# Patient Record
Sex: FEMALE | Race: WHITE
[De-identification: names, ages, dates, MRNs, and addresses within clinical notes are randomized per-mention and may not be internally consistent; named-entity substitution may affect disease eponyms.]

---

## 2018-06-27 ENCOUNTER — HOSPITAL ENCOUNTER (OUTPATIENT)
Dept: HOSPITAL 97 - ER | Age: 68
Setting detail: OBSERVATION
LOS: 1 days | Discharge: HOME | End: 2018-06-28
Attending: FAMILY MEDICINE | Admitting: FAMILY MEDICINE
Payer: COMMERCIAL

## 2018-06-27 VITALS — BODY MASS INDEX: 31.3 KG/M2

## 2018-06-27 DIAGNOSIS — F32.9: ICD-10-CM

## 2018-06-27 DIAGNOSIS — R07.9: Primary | ICD-10-CM

## 2018-06-27 DIAGNOSIS — I10: ICD-10-CM

## 2018-06-27 DIAGNOSIS — J44.9: ICD-10-CM

## 2018-06-27 DIAGNOSIS — E78.2: ICD-10-CM

## 2018-06-27 DIAGNOSIS — E03.9: ICD-10-CM

## 2018-06-27 DIAGNOSIS — G93.89: ICD-10-CM

## 2018-06-27 DIAGNOSIS — R51: ICD-10-CM

## 2018-06-27 LAB
ALBUMIN SERPL BCP-MCNC: 3.6 G/DL (ref 3.4–5)
ALP SERPL-CCNC: 62 U/L (ref 45–117)
ALT SERPL W P-5'-P-CCNC: 36 U/L (ref 12–78)
AST SERPL W P-5'-P-CCNC: 24 U/L (ref 15–37)
BUN BLD-MCNC: 23 MG/DL (ref 7–18)
CKMB CREATINE KINASE MB: 1.1 NG/ML (ref 0.3–3.6)
GLUCOSE SERPLBLD-MCNC: 102 MG/DL (ref 74–106)
HCT VFR BLD CALC: 41.8 % (ref 36–45)
INR BLD: 0.97
LYMPHOCYTES # SPEC AUTO: 5.3 K/UL (ref 0.7–4.9)
MAGNESIUM SERPL-MCNC: 2 MG/DL (ref 1.8–2.4)
MCH RBC QN AUTO: 28.3 PG (ref 27–35)
MCV RBC: 88.3 FL (ref 80–100)
NT-PROBNP SERPL-MCNC: 225 PG/ML (ref ?–125)
PMV BLD: 7.5 FL (ref 7.6–11.3)
POTASSIUM SERPL-SCNC: 4 MMOL/L (ref 3.5–5.1)
RBC # BLD: 4.74 M/UL (ref 3.86–4.86)
UA COMPLETE W REFLEX CULTURE PNL UR: (no result)

## 2018-06-27 PROCEDURE — 81001 URINALYSIS AUTO W/SCOPE: CPT

## 2018-06-27 PROCEDURE — 36415 COLL VENOUS BLD VENIPUNCTURE: CPT

## 2018-06-27 PROCEDURE — 83880 ASSAY OF NATRIURETIC PEPTIDE: CPT

## 2018-06-27 PROCEDURE — 80061 LIPID PANEL: CPT

## 2018-06-27 PROCEDURE — 94760 N-INVAS EAR/PLS OXIMETRY 1: CPT

## 2018-06-27 PROCEDURE — 93005 ELECTROCARDIOGRAM TRACING: CPT

## 2018-06-27 PROCEDURE — 93306 TTE W/DOPPLER COMPLETE: CPT

## 2018-06-27 PROCEDURE — 84146 ASSAY OF PROLACTIN: CPT

## 2018-06-27 PROCEDURE — 85730 THROMBOPLASTIN TIME PARTIAL: CPT

## 2018-06-27 PROCEDURE — 83735 ASSAY OF MAGNESIUM: CPT

## 2018-06-27 PROCEDURE — 83970 ASSAY OF PARATHORMONE: CPT

## 2018-06-27 PROCEDURE — 93017 CV STRESS TEST TRACING ONLY: CPT

## 2018-06-27 PROCEDURE — 84439 ASSAY OF FREE THYROXINE: CPT

## 2018-06-27 PROCEDURE — 84443 ASSAY THYROID STIM HORMONE: CPT

## 2018-06-27 PROCEDURE — 87086 URINE CULTURE/COLONY COUNT: CPT

## 2018-06-27 PROCEDURE — 80048 BASIC METABOLIC PNL TOTAL CA: CPT

## 2018-06-27 PROCEDURE — 87186 SC STD MICRODIL/AGAR DIL: CPT

## 2018-06-27 PROCEDURE — 85610 PROTHROMBIN TIME: CPT

## 2018-06-27 PROCEDURE — 70450 CT HEAD/BRAIN W/O DYE: CPT

## 2018-06-27 PROCEDURE — 87088 URINE BACTERIA CULTURE: CPT

## 2018-06-27 PROCEDURE — 70553 MRI BRAIN STEM W/O & W/DYE: CPT

## 2018-06-27 PROCEDURE — 84484 ASSAY OF TROPONIN QUANT: CPT

## 2018-06-27 PROCEDURE — 85025 COMPLETE CBC W/AUTO DIFF WBC: CPT

## 2018-06-27 PROCEDURE — 78452 HT MUSCLE IMAGE SPECT MULT: CPT

## 2018-06-27 PROCEDURE — 99285 EMERGENCY DEPT VISIT HI MDM: CPT

## 2018-06-27 PROCEDURE — 87077 CULTURE AEROBIC IDENTIFY: CPT

## 2018-06-27 PROCEDURE — 71045 X-RAY EXAM CHEST 1 VIEW: CPT

## 2018-06-27 PROCEDURE — 80076 HEPATIC FUNCTION PANEL: CPT

## 2018-06-27 PROCEDURE — 96372 THER/PROPH/DIAG INJ SC/IM: CPT

## 2018-06-27 PROCEDURE — 82553 CREATINE MB FRACTION: CPT

## 2018-06-27 PROCEDURE — 82550 ASSAY OF CK (CPK): CPT

## 2018-06-27 RX ADMIN — METOPROLOL TARTRATE SCH MG: 25 TABLET ORAL at 21:56

## 2018-06-27 RX ADMIN — Medication SCH ML: at 21:56

## 2018-06-27 RX ADMIN — ENOXAPARIN SODIUM SCH: 40 INJECTION SUBCUTANEOUS at 16:00

## 2018-06-27 NOTE — ER
Nurse's Notes                                                                                     

 [facility]                                                                

Name: Elizabeth Holley                                                                                 

Age: 67 yrs                                                                                       

Sex: Female                                                                                       

: 1950                                                                                   

MRN: [id number]                                                                                   

Arrival Date: 2018                                                                          

Time: 11:56                                                                                       

Account#: [id number]                                                                            

Bed 19                                                                                            

Private MD:                                                                                       

Diagnosis: Essential (primary) hypertension;Chest pain, unspecified                               

                                                                                                  

Presentation:                                                                                     

                                                                                             

11:57 Presenting complaint: Patient states: Reports HTN for 2 days with chest pressure and    aj  

      dizziness. Transition of care: patient was not received from another setting of care.       

      Onset of symptoms was 2018. Risk Assessment: Do you want to hurt yourself or       

      someone else? Patient reports no desire to harm self or others. Initial Sepsis Screen:      

      Does the patient meet any 2 criteria? No. Patient's initial sepsis screen is negative.      

      Does the patient have a suspected source of infection? No. Patient's initial sepsis         

      screen is negative. Care prior to arrival: None.                                            

11:57 Method Of Arrival: Ambulatory                                                             

11:57 Acuity: VENECIA 3                                                                           aj  

                                                                                                  

Triage Assessment:                                                                                

12:01 General: Appears in no apparent distress. comfortable, Behavior is calm, cooperative,   aj  

      appropriate for age. Pain: Complains of pain in chest. Neuro: Level of Consciousness is     

      awake, alert, obeys commands, Oriented to person, place, time, situation, Appropriate       

      for age. Cardiovascular: Reports chest pain, fatigue, lightheadedness, nausea,              

      Capillary refill < 3 seconds in bilateral fingers Patient's skin is warm and dry.           

      Respiratory: Airway is patent Respiratory effort is even, unlabored, Respiratory            

      pattern is regular, symmetrical. Derm: Skin is intact, is healthy with good turgor,         

      Skin is pink, warm \T\ dry. normal.                                                         

                                                                                                  

Historical:                                                                                       

- Allergies:                                                                                      

12:01 Hydrocodone-Acetaminophen;                                                              aj  

- Home Meds:                                                                                      

12:01 losartan-hydrochlorothiazide 100-12.5 mg oral tab 1 tab once daily [Active];            aj  

      levothyroxine oral [Active]; Simvastatin Oral [Active]; Temazepam Oral [Active];            

      Fluoxetine Oral [Active];                                                                   

- PMHx:                                                                                           

12:01 Hyperlipidemia; Hypertension; Depression;                                               aj  

- PSHx:                                                                                           

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  

                                                                                                  

- Immunization history:: Adult Immunizations up to date.                                          

- Social history:: Smoking status: Patient/guardian denies using tobacco.                         

- Ebola Screening: : Patient negative for fever greater than or equal to 101.5 degrees            

  Fahrenheit, and additional compatible Ebola Virus Disease symptoms Patient denies               

  exposure to infectious person Patient denies travel to an Ebola-affected area in the            

  21 days before illness onset No symptoms or risks identified at this time.                      

- Family history:: not pertinent.                                                                 

                                                                                                  

                                                                                                  

Screenin:30 Abuse screen: Denies threats or abuse. Denies injuries from another. Nutritional        jl7 

      screening: No deficits noted. Tuberculosis screening: No symptoms or risk factors           

      identified. Fall Risk IV access (20 points). Total Leyva Fall Scale indicates No Risk       

      (0-24 pts).                                                                                 

                                                                                                  

Assessment:                                                                                       

13:30 General: Appears in no apparent distress. uncomfortable, Behavior is calm, cooperative, jl7 

      appropriate for age. Pain: Complains of pain in chest Pain does not radiate. Pain           

      currently is 6 out of 10 on a pain scale. Quality of pain is described as pressure,         

      Pain began 2-3 days ago. Is intermittent. Neuro: Level of Consciousness is awake,           

      alert, obeys commands, Oriented to person, place, time, situation. Cardiovascular:          

      Heart tones S1 S2 present Patient's skin is warm and dry. Respiratory: Airway is patent     

      Respiratory effort is even, unlabored, Respiratory pattern is regular, symmetrical,         

      Breath sounds are clear bilaterally. GI: No signs and/or symptoms were reported             

      involving the gastrointestinal system. : No signs and/or symptoms were reported           

      regarding the genitourinary system. EENT: No signs and/or symptoms were reported            

      regarding the EENT system. Derm: Skin is pink, warm \T\ dry. Musculoskeletal: No signs      

      and/or symptoms reported regarding the musculoskeletal system.                              

                                                                                                  

Vital Signs:                                                                                      

12:01  / 96; Pulse 84; Resp 18; Temp 98.6; Pulse Ox 96% on R/A; Weight 88 kg; Height 5  aj  

      ft. 6 in. (167.64 cm);                                                                      

13:15  / 99; Pulse 62; Resp 17; Pulse Ox 92% on R/A;                                    jl7 

14:00  / 77; Pulse 65; Resp 16; Pulse Ox 94% on R/A;                                    jl7 

15:57  / 89; Pulse 69; Resp 16; Pulse Ox 97% ;                                          jl7 

12:01 Body Mass Index 31.31 (88.00 kg, 167.64 cm)                                               

                                                                                                  

ED Course:                                                                                        

11:56 Patient arrived in ED.                                                                  aj  

11:57 Triage completed.                                                                       aj  

12:01 Arm band placed on left wrist. Patient placed in an exam room.                          aj  

12:32 Endy Finn MD is Attending Physician.                                             arie 

12:54 Tawny Johnson RN is Primary Nurse.                                                      jl7 

12:58 X-ray completed. Portable x-ray completed in exam room. Patient tolerated procedure     jb2 

      well.                                                                                       

13:01 Patient moved to CT via wheelchair.                                                     sj  

13:02 XRAY Chest (1 view) In Process Unspecified.                                             EDMS

13:02 CT completed. Patient tolerated procedure well. Patient moved back from CT.             sj  

13:09 CT Head Brain wo Cont In Process Unspecified.                                           EDMS

13:30 Patient has correct armband on for positive identification. Placed in gown. Bed in low  jl7 

      position. Call light in reach. Side rails up X 1. Cardiac monitor on. Pulse ox on. NIBP     

      on.                                                                                         

13:30 Initial lab(s) drawn, by ED staff, sent to lab. Inserted saline lock: 22 gauge in left  jl7 

      antecubital area, using aseptic technique. Blood collected. Patient maintains SpO2          

      saturation greater than 95% on room air.                                                    

14:01 Kody Galindo MD is Hospitalizing Provider.                                            [facility] 

16:19 No provider procedures requiring assistance completed. Patient admitted, IV remains in  jl7 

      place.                                                                                      

                                                                                                  

Administered Medications:                                                                         

14:07 Drug: Lovenox 1 mg/kg Route: Sub-Q; Site: abdomen;                                      jl7 

16:09 Follow up: Response: No adverse reaction                                                jl7 

14:08 Drug: Aspirin Chewable Tablet 324 mg {Note: Pt reports taking 81 mg aspirin today,      jl7 

      Administered 3 tabs.} Route: PO;                                                            

16:09 Follow up: Response: No adverse reaction                                                jl7 

16:05 Drug: Tylenol 650 mg Route: PO;                                                         jl7 

16:09 Not Given (Physician Discretion): Lopressor (metoprolol TARTRATE) 50 mg PO once         jl7 

                                                                                                  

                                                                                                  

Outcome:                                                                                          

14:02 Decision to Hospitalize by Provider.                                                    arie 

16:19 Admitted to Tele accompanied by tech, family with patient, via wheelchair, room 415,    jl7 

      with chart, Report called to  REYNA Mckeon                                                      

16:19 Condition: stable                                                                           

16:19 Discharge instructions given to patient, family, Instructed on the need for admit,          

      Demonstrated understanding of instructions.                                                 

16:43 Patient left the ED.                                                                    jl7 

                                                                                                  

Signatures:                                                                                       

Dispatcher [employer]                           EDMS                                                 

Johnson, Therese, Endy Gambino RN, MD MD cha Buechter, Jesse jb2 Jones, Susan sj Martinez, Maria                              [facility]                                                  

Tawny Johnson RN                        RN   jl7                                                  

                                                                                                  

Corrections: (The following items were deleted from the chart)                                    

14:12 13:57  / 99; Pulse 62bpm; Resp 17bpm; Pulse Ox 92% RA; [facility] 

                                                                                                  

**************************************************************************************************

## 2018-06-27 NOTE — EKG
Test Date:    2018-06-27               Test Time:    11:59:34

Technician:   GISELLE                                    

                                                     

MEASUREMENT RESULTS:                                       

Intervals:                                           

Rate:         75                                     

WY:           162                                    

QRSD:         94                                     

QT:           406                                    

QTc:          453                                    

Axis:                                                

P:            54                                     

WY:           162                                    

QRS:          -21                                    

T:            51                                     

                                                     

INTERPRETIVE STATEMENTS:                                       

                                                     

Sinus rhythm with premature supraventricular complexes

Incomplete right bundle branch block

Cannot rule out Anterior infarct, age undetermined

Abnormal ECG

Compared to ECG 06/15/2014 13:13:04

Atrial premature complex(es) now present

Myocardial infarct finding now present

Prolonged QT interval no longer present



Electronically Signed On 06-27-18 16:53:09 CDT by Bo Bill

## 2018-06-27 NOTE — ECHO
HEIGHT:  ft  in   WEIGHT:  lb  oz   DATE OF STUDY: 06/27/2018   REFER DR: Kody Galindo MD

2-DIMENSIONAL: YES

     M.MODE: YES

 DOPPLER: YES

COLOR FLOW: YES



                    TDS:  

PORTABLE: 

 DEFINITY:  

BUBBLE STUDY: 





DIAGNOSIS:  



CARDIAC HISTORY:  

CATHERIZATION: YES

SURGERY: NO

PROSTHETIC VALVE: NO

PACEMAKER: NO





MEASUREMENTS (cm)

    DIASTOLIC (NORMALS)                 SYSTOLIC (NORMALS)

IVSd                 1.1 (0.6-1.2)                    LA Diam 2.8 (1.9-4.0)     LVEF       
  66%  

LVIDd               4.0 (3.5-5.7)                        LVIDs      2.5 (2.0-3.5)     %FS  
        36%

LVPWd             1.1 (0.6-1.2)

Ao Diam           3.2 (2.0-3.7)



2 DIMENSIONAL ASSESSMENT:

RIGHT ATRIUM:                   NORMAL

LEFT ATRIUM:       NORMAL



RIGHT VENTRICLE:            NORMAL

LEFT VENTRICLE: NORMAL



TRICUSPID VALVE:             NORMAL

MITRAL VALVE:     NORMAL



PULMONIC VALVE:             NORMAL

AORTIC VALVE:     NORMAL



PERICARDIAL EFFUSION: NONE

AORTIC ROOT:      NORMAL





LEFT VENTRICULAR WALL MOTION:     NORMAL



DOPPLER/COLOR FLOW:    IMPAIRED LEFT VENTRICULAR RELAXATION.   

 MILD AORTIC REGURGITATION. 



COMMENTS:      NORMAL TWO DIMENSIONAL ECHOCARDIOGRAM.  IMPAIRED LEFT VENTRICULAR 
RELAXATION.  MILD AORTIC REGURGITATION.



TECHNOLOGIST:   DARA MARTINEZ

## 2018-06-27 NOTE — RAD REPORT
EXAM DESCRIPTION:  CT - Head Brain Wo Cont - 6/27/2018 1:09 pm

 

CLINICAL HISTORY:  Headache, hypertension

 

COMPARISON:  None.

 

TECHNIQUE:  Axial 5 mm thick images of the head were obtained without IV contrast.

 

All CT scans are performed using dose optimization technique as appropriate and may include automated
 exposure control or mA/KV adjustment according to patient size.

 

FINDINGS:  No intracranial hemorrhage present. No edema, mass effect or shift of midline structures. 
No cortical edema or sulcal effacement. No significant atrophy and only minimal chronic ischemic owens
ge. Ventricles are normal.

 

In the supra sella region there is an 8 millimeter fatty mass with calcification along the posterior 
wall. Dermoid/teratoma and hypothalamic lipoma would be differential considerations. The low-density 
craniopharyngioma felt to be a lesser consideration. No CT findings that would suggest rupture of a d
ermoid/teratoma.

 

Mastoid air cells and visualized portions of the paranasal sinuses are clear.

 

No acute bony findings.

 

 

IMPRESSION:  No hemorrhage or acute intracranial finding.

 

A small 8 mm fatty mass in the suprasellar region is present likely a dermoid/teratoma or hypothalami
c lipoma.  No evidence for leakage or rupture.

 

Supra sella masses can be associated with headache symptoms. However, this usually requires a much la
rger sized mass or rupture of a dermoid/ teratoma, neither of which are present on this examination.

## 2018-06-27 NOTE — RAD REPORT
EXAM DESCRIPTION:  RAD - Chest Single View - 6/27/2018 1:02 pm

 

CLINICAL HISTORY:  Chest pain, shortness of breath

 

COMPARISON:  May 2017

 

TECHNIQUE:  AP portable chest image was obtained 1255 hours .

 

FINDINGS:  Interstitial markings are mildly prominent, increased from comparison. A few granulomas ar
e present. No acute failure, infiltrate or mass. Heart and vasculature are normal. No measurable pleu
ral effusion and no pneumothorax. No gross bony abnormality seen. No acute aortic findings suspected.


 

IMPRESSION:  No acute cardiopulmonary process.

 

Patient has a mild baseline interstitial lung disease pattern. This is stable back to May 2017.

## 2018-06-27 NOTE — RAD REPORT
EXAM DESCRIPTION:  MRI - Brain Inc Pituitary W/Wo Con - 6/27/2018 7:21 pm

 

CLINICAL HISTORY:  Supra sella mass, headaches, hypertension

 

COMPARISON:  CT head June 27

 

TECHNIQUE:  Sagittal T1 weighted, axial proton density, T2 and T2 stir sequences were obtained. Axial
 diffusion-weighted imaging and ADC mapping sequences obtained. Thin-section sagittal and axial preco
ntrast and dynamically enhanced T1 images were obtained. Coronal imaging performed as well. A 19 mill
iliter MultiHance gadolinium contrast volume was utilized.

 

FINDINGS:  A 10 millimeter supra sella mass is present along the posterior aspect of the suprasellar 
region. The mass has homogeneous hyperintense T1 signal in masses more heterogeneous on T2 weighted s
equencing but is still predominantly hyperintense. Mass is hyperintense on T2 STIR sequencing with li
ttle identifiable signal on diffusion-weighted imaging. Post-contrast imaging shows no enhancement wi
thin the mass or along the periphery. There is normal enhancement of the pituitary stalk. No abnormal
 enhancement of the pituitary gland which is seen along the floor of the sella. No intra sella compon
ent.

 

No suspicious signal abnormality elsewhere on the examination. No enhancement abnormality. No acute i
nfarction changes are seen. Very minimal amounts of chronic ischemic change seen in the cerebral whit
e matter. Volume loss is minimal.

 

The supra sella mass signal is contained to the mass itself. No adjacent signal abnormalities to sugg
est rupture or leakage from the mass.

 

IMPRESSION:  Approximately 10 millimeter fully suprasella oval mass is present and is the correlate t
o the CT finding.

 

Patient has atrophy and chronic ischemic change including chronic ischemic change of the lobito. No acu
te infarction or other acute intracranial finding.

 

The supra sella mass differential includes dermoid/teratoma. A cystic suprasella only craniopharyngio
ma would be possible though this is an uncommon presentation. Hypothalamic lipoma would be a primary 
consideration as well. An aggressive neoplastic process is not suspected. Aneurysm is not suspected.

 

No mass effect is seen from this mass. There is no signal abnormality that would suggest leakage or r
upture of a dermoid/teratoma. It is most likely that this mass is unrelated to the patient's headache
 symptoms.

## 2018-06-27 NOTE — EDPHYS
Physician Documentation                                                                           

 [facility]                                                                

Name: Elizabeth Holley                                                                                 

Age: 67 yrs                                                                                       

Sex: Female                                                                                       

: 1950                                                                                   

MRN: [id number]                                                                                   

Arrival Date: 2018                                                                          

Time: 11:56                                                                                       

Account#: [id number]                                                                            

Bed 19                                                                                            

Private MD:                                                                                       

ED Physician Endy Finn                                                                      

HPI:                                                                                              

                                                                                             

13:58 This 67 yrs old  Female presents to ER via Ambulatory with complaints of High  arie 

      Blood Pressure, Chest Pain.                                                                 

13:58 The patient has elevated blood pressure and discovered this at home. Onset: The         arie 

      symptoms/episode began/occurred 3 day(s) ago.                                               

13:59 Onset: 3 day(s) ago. Modifying factors: The symptoms are aggravated by activity, The    arie 

      symptoms are alleviated by remaining still. The pain does not radiate. Associated signs     

      and symptoms: Pertinent positives: dizziness, headache. The chest pain is described as      

      a heaviness, a pressure.                                                                    

                                                                                                  

Historical:                                                                                       

- Allergies:                                                                                      

12:01 Hydrocodone-Acetaminophen;                                                              aj  

- Home Meds:                                                                                      

12:01 losartan-hydrochlorothiazide 100-12.5 mg oral tab 1 tab once daily [Active];            aj  

      levothyroxine oral [Active]; Simvastatin Oral [Active]; Temazepam Oral [Active];            

      Fluoxetine Oral [Active];                                                                   

- PMHx:                                                                                           

12:01 Hyperlipidemia; Hypertension; Depression;                                               aj  

- PSHx:                                                                                           

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  

                                                                                                  

- Immunization history:: Adult Immunizations up to date.                                          

- Social history:: Smoking status: Patient/guardian denies using tobacco.                         

- Ebola Screening: : Patient negative for fever greater than or equal to 101.5 degrees            

  Fahrenheit, and additional compatible Ebola Virus Disease symptoms Patient denies               

  exposure to infectious person Patient denies travel to an Ebola-affected area in the            

  21 days before illness onset No symptoms or risks identified at this time.                      

- Family history:: not pertinent.                                                                 

                                                                                                  

                                                                                                  

ROS:                                                                                              

13:59 Constitutional: Negative for fever, chills, and weight loss, Eyes: Negative for injury, arie 

      pain, redness, and discharge, ENT: Negative for injury, pain, and discharge, Neck:          

      Negative for injury, pain, and swelling, Respiratory: Negative for shortness of breath,     

      cough, wheezing, and pleuritic chest pain, Abdomen/GI: Negative for abdominal pain,         

      nausea, vomiting, diarrhea, and constipation, Back: Negative for injury and pain, :       

      Negative for injury, bleeding, discharge, and swelling, MS/Extremity: Negative for          

      injury and deformity, Skin: Negative for injury, rash, and discoloration, Psych:            

      Negative for depression, anxiety, suicide ideation, homicidal ideation, and                 

      hallucinations.                                                                             

13:59 Cardiovascular: Positive for chest pain.                                                    

13:59 Neuro: Positive for headache.                                                               

                                                                                                  

Exam:                                                                                             

13:59 Constitutional:  This is a well developed, well nourished patient who is awake, alert,  arie 

      and in no acute distress. Head/Face:  Normocephalic, atraumatic. Eyes:  Pupils equal        

      round and reactive to light, extra-ocular motions intact.  Lids and lashes normal.          

      Conjunctiva and sclera are non-icteric and not injected.  Cornea within normal limits.      

      Periorbital areas with no swelling, redness, or edema. ENT:  Nares patent. No nasal         

      discharge, no septal abnormalities noted.  Tympanic membranes are normal and external       

      auditory canals are clear.  Oropharynx with no redness, swelling, or masses, exudates,      

      or evidence of obstruction, uvula midline.  Mucous membranes moist. Neck:  Trachea          

      midline, no thyromegaly or masses palpated, and no cervical lymphadenopathy.  Supple,       

      full range of motion without nuchal rigidity, or vertebral point tenderness.  No            

      Meningismus. Chest/axilla:  Normal chest wall appearance and motion.  Nontender with no     

      deformity.  No lesions are appreciated. Cardiovascular:  Regular rate and rhythm with a     

      normal S1 and S2.  No gallops, murmurs, or rubs.  Normal PMI, no JVD.  No pulse             

      deficits. Respiratory:  Lungs have equal breath sounds bilaterally, clear to                

      auscultation and percussion.  No rales, rhonchi or wheezes noted.  No increased work of     

      breathing, no retractions or nasal flaring. Abdomen/GI:  Soft, non-tender, with normal      

      bowel sounds.  No distension or tympany.  No guarding or rebound.  No evidence of           

      tenderness throughout. Back:  No spinal tenderness.  No costovertebral tenderness.          

      Full range of motion. Female :  Normal external genitalia. Skin:  Warm, dry with          

      normal turgor.  Normal color with no rashes, no lesions, and no evidence of cellulitis.     

      MS/ Extremity:  Pulses equal, no cyanosis.  Neurovascular intact.  Full, normal range       

      of motion. Neuro:  Awake and alert, GCS 15, oriented to person, place, time, and            

      situation.  Cranial nerves II-XII grossly intact.  Motor strength 5/5 in all                

      extremities.  Sensory grossly intact.  Cerebellar exam normal.  Normal gait. Psych:         

      Awake, alert, with orientation to person, place and time.  Behavior, mood, and affect       

      are within normal limits.                                                                   

14:01 Musculoskeletal/extremity: DVT Exam: No signs of deep vein thrombosis. no pain, no      arie 

      swelling, no tenderness, negative Homans' sign noted on exam, no appreciated bluish         

      discoloration, no erythema, no increased warmth.                                            

                                                                                                  

Vital Signs:                                                                                      

12:01  / 96; Pulse 84; Resp 18; Temp 98.6; Pulse Ox 96% on R/A; Weight 88 kg; Height 5  aj  

      ft. 6 in. (167.64 cm);                                                                      

13:15  / 99; Pulse 62; Resp 17; Pulse Ox 92% on R/A;                                    jl7 

14:00  / 77; Pulse 65; Resp 16; Pulse Ox 94% on R/A;                                    jl7 

15:57  / 89; Pulse 69; Resp 16; Pulse Ox 97% ;                                          jl7 

12:01 Body Mass Index 31.31 (88.00 kg, 167.64 cm)                                             aj  

                                                                                                  

MDM:                                                                                              

12:32 Patient medically screened.                                                             [facility] 

14:00 Data reviewed: vital signs, nurses notes, lab test result(s), EKG, radiologic studies,  [facility] 

      CT scan, MRI, plain films.                                                                  

                                                                                                  

                                                                                             

12:44 Order name: Basic Metabolic Panel; Complete Time: 15:                                                                                             

12:44 Order name: CBC with Diff; Complete Time: 15:                                                                                             

12:44 Order name: Ckmb; Complete Time: 15:                                                                                             

12:44 Order name: CPK; Complete Time: 15:                                                                                             

12:44 Order name: LFT's; Complete Time: 15:                                                                                             

12:44 Order name: Magnesium; Complete Time: 15:                                                                                             

12:44 Order name: NT PRO-BNP; Complete Time: 15:                                                                                             

12:44 Order name: PT-INR; Complete Time: 15:                                                                                             

12:44 Order name: Ptt, Activated; Complete Time: 15:                                                                                             

12:44 Order name: Troponin (emerg Dept Use Only); Complete Time: 15:                                                                                             

12:44 Order name: XRAY Chest (1 view); Complete Time: 15:09                                     

                                                                                             

12:45 Order name: CT Head Brain wo Cont; Complete Time: 13:49                                   

                                                                                             

13:57 Order name: Brain Wo Cont MRI                                                           [facility] 

                                                                                             

12:44 Order name: EKG; Complete Time: 12:45                                                     

                                                                                             

12:44 Order name: Cardiac monitoring; Complete Time: 13:59                                      

                                                                                             

12:44 Order name: EKG - Nurse/Tech; Complete Time: 13:59                                        

                                                                                             

12:44 Order name: IV Saline Lock; Complete Time: 13:59                                          

                                                                                             

12:44 Order name: Labs collected and sent; Complete Time: 13:59                                 

                                                                                             

12:44 Order name: O2 Per Protocol; Complete Time: 13:58                                         

                                                                                             

12:44 Order name: O2 Sat Monitoring; Complete Time: 13:58                                       

                                                                                             

12:44 Order name: Urine Dipstick-Ancillary (obtain specimen); Complete Time: 13:58              

                                                                                             

14:06 Order name: CONS Physician Consult                                                      EDMS

                                                                                                  

Administered Medications:                                                                         

14:07 Drug: Lovenox 1 mg/kg Route: Sub-Q; Site: abdomen;                                       

16:09 Follow up: Response: No adverse reaction                                                7 

14:08 Drug: Aspirin Chewable Tablet 324 mg {Note: Pt reports taking 81 mg aspirin today,      jl7 

      Administered 3 tabs.} Route: PO;                                                            

16:09 Follow up: Response: No adverse reaction                                                7 

16:05 Drug: Tylenol 650 mg Route: PO;                                                         7 

16:09 Not Given (Physician Discretion): Lopressor (metoprolol TARTRATE) 50 mg PO once         jl7 

                                                                                                  

                                                                                                  

Disposition:                                                                                      

18 14:02 Hospitalization ordered by Kody Galindo for Observation. Preliminary diagnosis    

  are Essential (primary) hypertension, Chest pain, unspecified.                                  

- Bed requested for Telemetry/MedSurg (observation).                                              

- Status is Observation.                                                                      jl7 

- Condition is Fair.                                                                              

- Problem is new.                                                                                 

- Symptoms have improved.                                                                         

UTI on Admission? No                                                                              

                                                                                                  

                                                                                                  

                                                                                                  

Signatures:                                                                                       

Dispatcher [employer]                           EDMS                                                 

Therese Johnson RN RN aj Anderson, Corey, MD MD cha Williams, Irene, RN RN   iw                                                   

Jf Campos                               em1                                                  

Tawny Johnson RN RN   jl7                                                  

                                                                                                  

Corrections: (The following items were deleted from the chart)                                    

16:07 14:02 Hospitalization Ordered by Kody Galindo MD for Observation. Preliminary diagnosis em1 

      is Essential (primary) hypertension; Chest pain, unspecified. Bed requested for             

      Telemetry/MedSurg (observation). Status is Observation. Condition is Fair. Problem is       

      new. Symptoms have improved. UTI on Admission? No. arie                                      

16:43 16:07 2018 14:02 Hospitalization Ordered by Kody Galindo MD for Observation.      jl7 

      Preliminary diagnosis is Essential (primary) hypertension; Chest pain, unspecified. Bed     

      requested for Telemetry/MedSurg (observation). Status is Observation. Condition is          

      Fair. Problem is new. Symptoms have improved. UTI on Admission? No. em1                     

                                                                                                  

**************************************************************************************************

## 2018-06-28 VITALS — TEMPERATURE: 97.2 F | DIASTOLIC BLOOD PRESSURE: 72 MMHG | SYSTOLIC BLOOD PRESSURE: 128 MMHG

## 2018-06-28 VITALS — OXYGEN SATURATION: 95 %

## 2018-06-28 LAB
BUN BLD-MCNC: 25 MG/DL (ref 7–18)
GLUCOSE SERPLBLD-MCNC: 102 MG/DL (ref 74–106)
HCT VFR BLD CALC: 39.4 % (ref 36–45)
HDLC SERPL-MCNC: 60 MG/DL (ref 40–60)
LDLC SERPL CALC-MCNC: 70 MG/DL (ref ?–130)
LYMPHOCYTES # SPEC AUTO: 5 K/UL (ref 0.7–4.9)
MCH RBC QN AUTO: 28.7 PG (ref 27–35)
MCV RBC: 88.3 FL (ref 80–100)
PMV BLD: 7.4 FL (ref 7.6–11.3)
POTASSIUM SERPL-SCNC: 3.5 MMOL/L (ref 3.5–5.1)
RBC # BLD: 4.46 M/UL (ref 3.86–4.86)
TSH SERPL DL<=0.05 MIU/L-ACNC: 6.52 UIU/ML (ref 0.36–3.74)

## 2018-06-28 RX ADMIN — ACETAMINOPHEN PRN MG: 500 TABLET, FILM COATED ORAL at 10:50

## 2018-06-28 RX ADMIN — Medication SCH ML: at 12:13

## 2018-06-28 RX ADMIN — METOPROLOL TARTRATE SCH MG: 25 TABLET ORAL at 12:14

## 2018-06-28 RX ADMIN — METOPROLOL TARTRATE SCH: 25 TABLET ORAL at 09:00

## 2018-06-28 RX ADMIN — ENOXAPARIN SODIUM SCH MG: 40 INJECTION SUBCUTANEOUS at 12:13

## 2018-06-28 RX ADMIN — ACETAMINOPHEN PRN MG: 500 TABLET, FILM COATED ORAL at 03:17

## 2018-06-28 NOTE — RAD REPORT
EXAM DESCRIPTION:  NM - Rest Stress Cardiac Imaging - 6/28/2018 12:17 pm

 

CLINICAL HISTORY:  Chest pain

 

COMPARISON:  None.

 

TECHNIQUE:  The patient was administered 10.3 mCi of Tc 99m Sestamibi prior to resting SPECT imaging 
of the heart. The patient was then administered 30.3 mCi of Tc 99m Sestamibi following exercise or ph
armacologic stress. Multiplanar SPECT images were reviewed.

 

FINDINGS:  The end diastolic volume is 98 ml, the end systolic volume is 34 ml, and the ejection frac
tion is 65 %.

 

No stress-induced ischemic changes are identifiable. No fixed decreased activity to suspect scarred m
yocardium or significant attenuation artifact.

 

IMPRESSION:  No stress induced ischemia or other suspicious findings.

 

Ventricular volumes and ejection fraction are normal range.

## 2018-06-28 NOTE — HP
Date of Admission:  06/27/2018



Primary Care Physician:  Dr. Macdonald.



Chief Complaint:  Chest pain.



History Of Present Illness:  The patient is a 67-year-old female with past 
medical history of hypertension, depression, hyperlipidemia, COPD, 
hypothyroidism, who was in her usual state of health until 2 days prior to 
admission when the patient had sudden onset of chest tightness associated with 
some shortness of breath.  Tightness lasted for 5 minutes.  No radiation.  No 
nausea, vomiting, diaphoresis, or palpitations.  The patient also had 
subsequent headache associated with elevated blood pressure with a systolic in 
the 180s.  The patient came into the ER yesterday, went to triage to have her 
blood pressure checked, and confirmed that it was elevated and went back home 
to take her blood pressure medications.  She did not want to be evaluated by an 
ER physician at that time.  The patient returned to the ER today as her blood 
pressure remained elevated and she was unable to see her PCP whose office is 
closed on Wednesdays.  The patient states that she still has some chest 
tightness, however, is significantly improved from the first episode.  Her 
symptoms are constant, moderate, and progressively worsening.  No alleviating 
or aggravating factors.  No recent changes in medications.  She has been 
compliant with her blood pressure medications.  When seen in the ER, she was 
awake, alert, and oriented x3.  Her workup [place] [id number].  
Her initial troponin level was negative.  Her chest x-ray did not show any 
acute changes.  Her head CT scan, however, did show an 8 mm fatty mass in the 
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  No 
evidence of leakage or rupture.  The patient was then referred for admission.



Past Medical History:  Hypertension, depression, hyperlipidemia, COPD, 
hypothyroidism.



Surgical History:  Splenectomy, appendectomy, hysterectomy, left hand surgery, 
and tonsillectomy.



Allergies:  TO HYDROCODONE.



Medications:  Lis reviewed.



Family History:  Positive for arthritis, anxiety, and depression in the mom.



Social History:  The patient smoked heavily for the past and has quit 20 years 
ago.  Drinks alcohol seldomly.  No illicit drug use.  The patient is 
independent in her activities of daily living.



Review of Systems:

An 11-point system reviewed, negative except as per HPI.



Physical Examination:

Vital Signs:  Temperature 98.6, respirations 18, pulse 84, blood pressure 116/96
, O2 96% on room air. 

General:  Awake, alert, oriented x3.  Some mild distress due to headache.  
Elderly female, slightly ill appearing. 

HEENT:  Normocephalic, atraumatic.  PERRLA.  EOMI.  Moist mucous membranes.  
Oropharynx is clear.  Poor dentition.  Conjunctivae anicteric. 

Neck:  Supple.  No JVD.  Trachea midline. 

CV:  S1, S2.  No murmurs.  Regular rate and rhythm.  Peripheral pulses are 
present. 

Respiratory:  Clear to auscultation bilaterally.  No wheezing.  No stridor.  No 
use of accessory muscles. 

Gastrointestinal:  Abdomen is soft, nontender, and nondistended.  Positive 
bowel sounds.  No guarding or rigidity. 

Extremities:  No clubbing, cyanosis.  Trace pedal edema.  No calf tenderness. 

Neuro:  Cranial nerves 2 through 12 intact grossly.  No focal neurological 
deficit.  Speech is normal.  Strength is 5/5 bilateral upper and lower 
extremities.  Sensation intact to light touch. 

Skin:  No rashes.  Normal skin turgor. 

Psych:  Mood is okay.  Affect is full.  Insight and judgment are good.



Laboratory Data:  Sodium 137, potassium 4, chloride 101, CO2 of 30, BUN 23, 
creatinine 0.9, glucose 102, calcium 9.9, magnesium 2.  Troponin less than 
0.03.  .  WBC 15.2, H and H 13.4 and 41.8, platelets 567, neutrophils 54%
.  INR 0.97.  Chest x-ray, personally reviewed, shows no acute intrathoracic 
process.  Head CT scan shows no hemorrhage or acute intracranial findings.  
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 
teratoma or hypothalamic lipoma.  No evidence of leakage or rupture.



Assessment And Plan:  A 67-year-old female with;

1.   Chest pain, rule out acute coronary syndrome.  We will start on chest pain 
guidelines, beta-blocker, aspirin, statin, ACE inhibitor.  We will obtain 
serial cardiac enzymes and EKG.  Morphine and nitroglycerin p.r.n. for pain.  
Cardiology consultation.  We will place on cardiac telemetry.

2.   Headache, unclear etiology, may be related to elevated blood pressure.  CT 
scan of the head does not show any acute intracranial bleed.  Does have 
incidental finding of 8 mm suprasellar mass which is likely a lipoma or 
teratoma.

3.   Essential hypertension, uncontrolled.  Accelerated hypertension.  We will 
add p.r.n. medications.

4.   Mixed hyperlipidemia.  We will check lipid panel.  Continue statin.

5.   Major depressive disorder.  Continue home medications.

6.   Chronic obstructive pulmonary disease and chronic bronchitis.  Albuterol 
as needed.

7.   Hypothyroidism.  We will check TSH.  Continue home medications.

8.   Gastrointestinal and deep venous thrombosis prophylaxes with PPI, Lovenox.



Plan:  Admit the patient to Med-Surg, place as observation. No living will or 
MPOA





/GUSTAVO

DD:  06/27/2018 15:09:54   Voice ID:  [id number]

MTDBURAK

## 2018-06-28 NOTE — TREADPHA
DX:  CHEST PAIN



Date of Study: 06/28/2018





Ht: 5 6 

Wt:  194 lb 0 oz



Consulting Physician:  MARVIN







MEDICATIONS:  SIMVASTIN, LOSARTAN, FLUOXETINE, TEMAZEPAM, LEVOTHYROXINE, ALBUTEROL, 
TRAMADOL, LIPITOR, LISINOPRIL, NITROSTAT, TYLENOL EXTRA STRENGTH, ASPIRIN, LOVENOX, 
RESTORIL



HISTORY:  67 YEAR OLD FEMALE WITH COMPLAINTS OF CHEST PAIN.  HISTORY OF HYPERTENSION, 
DYSLIPIDEMIA AND DEPRESSION.



PHYSICIAL EXAMINATION: 

            



RESTING B.P.:  122/66

RESTING H.R.:  56





RESTING EKG:  SINUS BRADYCARDIA, OTHERWISE NORMAL

                            

PROTOCOL:  LEXISCAN

EXERCISE TIME:  3:30 

B.P. AT PEAK STRESS:  127/101

IN RECOVERY:                126/71





IMPRESSION:  LEXISCAN INJECTED, CARDIOLITE INJECTED PER PROTOCOL.  SEE NUCLEAR MEDICINE 
REPORT.  NO SUPRA VENTRICULAR TACHYCARDIA.  NO VENTRICULAR TACHYCARDIA.  DENIED CHEST 
PAIN.  NEGATIVE EXERCISE TOLERANCE TEST.

## 2018-06-29 NOTE — DS
Date of Discharge:  06/28/2018



Consultants:  Dr. Reveles with Cardiology.



Admitting Diagnoses:  

1.Chest pain, rule out acute coronary syndrome.

2.Headache.

3.Incidental finding of brain mass.

4.Essential hypertension.

5.Mixed hyperlipidemia.

6.Major depressive disorder.

7.Chronic obstructive pulmonary disease, chronic bronchitis.

8.Hypothyroidism.



Discharge Diagnoses:  

1.Chest pain.  Acute coronary syndrome ruled out.

2.Headache, improved.

3.Essential hypertension, uncontrolled.  Medications adjusted.

4.Mixed hyperlipidemia.  Continue statin.

5.Major depressive disorder, stable.

6.Chronic obstructive pulmonary disease, chronic bronchitis.

7.Hypothyroidism.  TSH is elevated.  Levothyroxine dose adjusted.

8.Incidental finding of a brain mass, 8 mm suprasellar lesion which is likely lipoma or teratoma.



Hospital Course:  The patient is a 67-year-old female comes in with chest pain and headache.  The pat
ient was started on chest pain guidelines.  Her workup was negative including cardiac enzymes x3.  Th
e patient was seen by Cardiology, recommended a nuclear stress test, which is not showed any stress-i
nduced ischemia.  Echocardiogram was done, which showed EF of 66%, did show some impaired left ventri
cular relaxation.  The patient otherwise did well.  Her chest pain had resolved.  She did have some h
eadache and her blood pressure was not well controlled in the 200 systolic.  Blood pressure medicatio
ns were adjusted.  Her blood pressure did improve to the 120s to 130s.  Triglycerides were elevated. 
 The patient did have a PTH, which was normal.  TSH was elevated.  Her Synthroid dose was adjusted.  
Prolactin was also checked due to the lesion in the brain.  MRI was done to further elucidate the zhen
ology of the mass felt to be a cystic suprasellar mass, possibly dermoid or teratoma.  A hypothalamic
 lipoma was also possibility.  Aggressive neoplastic process was not suspected.  No aneurysm was seen
.  The patient was recommended to follow up with the prolactin level and have a repeat MRI of the bra
in in 3-6 months.  Follow up with her primary care physician and to seek neurosurgical evaluation if 
the lesion has changed.  The patient was then cleared for discharge from a cardiology standpoint.  He
r symptoms had resolved.  No further chest pain.  Blood pressure was significantly improved.  The pat
ient was sent home in a stable condition.



Activity:  As tolerated.



Medications:  As per medication reconciliation list.



Followup:  Follow up with Dr. Franklin in 2-3 days.  Follow up with Dr. Reveles with 2 weeks.



Diet:  Heart healthy.



Physical Examination:

General:  Awake, alert, oriented x3.  No acute distress. 

CV:  S1, S2.  No murmurs. 

Respiratory:  Clear to auscultation bilaterally.  No wheezing gastrointestinal. 

Abdomen:  Abdomen is soft, nontender, nondistended.  Positive bowel sounds. 

Extremities:  No clubbing, cyanosis, edema. 

Neurologic:  Nonfocal.





SA/MODL

DD:  06/28/2018 14:48:23Voice ID:  [id number]

DT:  06/29/2018 02:35:47Report ID:  [id number]

## 2018-06-29 NOTE — CON
Date of Consultation:  06/27/2018



Admitted to Dr. Galindo service on 06/27/2018.  The patient was seen by me on 06/28/2018.



Reason For Consultation:  Chest pain and hypertension.



History Of Present Illness:  Ms. Holley is a 67-year-old white woman, who has a history of hypertension
, dyslipidemia, depression, hypothyroidism, and COPD.  Noted that her blood pressure is elevated at h
ome, was 188/112.  Symptoms lasted for about 5 minutes and that included headache, chest tightness, s
ome nausea, but no diaphoresis or shortness of breath.  Denied any PND, orthopnea, pedal edema, palpi
tation, or syncope.  She was not sure why her blood pressure went up.  She is according to her fairly
 compliant with her medication.  She has also had a history of splenectomy.



Allergies:  HER ALLERGIES INCLUDE HYDROCODONE.



Review of Systems:

Negative.



Social History:  Negative.



Family History:  Noncontributory.



Medications:  At home include inhalers, Prozac, Synthroid, Dyazide, and Zocor.



Physical Examination:

General:  She is very pleasant, in no acute distress. 

HEENT:  Negative. 

Neck:  Supple without any lymphadenopathy, JVD, or thyromegaly. 

Chest:  Clear to auscultation and percussion. 

Cardiac:  Regular rhythm and rate without any murmurs, gallops, or rubs. 

Abdomen:  Benign. 

Extremities:  Revealed no clubbing, cyanosis, or edema.



Diagnostic Data:  She had a chest x-ray, which showed fibrosis.  EKG showed left bundle branch block.
  She had an MRI which was positive and __________ solid mass with a CVA.  Her white count was 13,000
, platelets was 526,000.  Her TSH was 6.52.  Her BNP was 225, triglyceride 160.



Impression And Plan:  

1.Atypical chest pain that I think is more likely related to uncontrolled hypertension.  I think she
 has a Lexiscan pending and we will see what that shows prior to making final decision.  She needs to
 continue __________ just adding amlodipine to her regimen and low-dose beta-blockers.

2.Depression.

3.Hypertension.

4.Dyslipidemia.

5.Hypothyroidism.

6.Pulmonary fibrosis.

7.Status post splenectomy, which is the cause of her elevated white count and platelets. 

45 minutes was spent in the care of Ms. Holley.  She was instructed regarding sodium restriction, regar
ding her hypertension.  She was instructed regarding low-fat, low-cholesterol diet.  Regarding her ch
olesterol, she was educated about compliance with medication.  The case was discussed with her, with 
Dr. Galindo, and with her nurse.  We will await the results of Lexiscan prior to making final decision 
about her discharge.





NB/GUSTAVO

DD:  06/29/2018 07:40:59Voice ID:  [id number]

DT:  06/29/2018 09:30:24Report ID:  [id number]

## 2019-10-06 ENCOUNTER — HOSPITAL ENCOUNTER (INPATIENT)
Dept: HOSPITAL 97 - ER | Age: 69
LOS: 2 days | Discharge: HOME | DRG: 872 | End: 2019-10-08
Attending: FAMILY MEDICINE | Admitting: FAMILY MEDICINE
Payer: COMMERCIAL

## 2019-10-06 VITALS — BODY MASS INDEX: 33 KG/M2

## 2019-10-06 DIAGNOSIS — N30.01: ICD-10-CM

## 2019-10-06 DIAGNOSIS — Z23: ICD-10-CM

## 2019-10-06 DIAGNOSIS — E78.5: ICD-10-CM

## 2019-10-06 DIAGNOSIS — A41.9: Primary | ICD-10-CM

## 2019-10-06 DIAGNOSIS — F32.9: ICD-10-CM

## 2019-10-06 DIAGNOSIS — I10: ICD-10-CM

## 2019-10-06 DIAGNOSIS — E03.9: ICD-10-CM

## 2019-10-06 DIAGNOSIS — B96.89: ICD-10-CM

## 2019-10-06 DIAGNOSIS — N17.9: ICD-10-CM

## 2019-10-06 LAB
ALBUMIN SERPL BCP-MCNC: 3.3 G/DL (ref 3.4–5)
ALP SERPL-CCNC: 68 U/L (ref 45–117)
ALT SERPL W P-5'-P-CCNC: 23 U/L (ref 12–78)
AST SERPL W P-5'-P-CCNC: 16 U/L (ref 15–37)
BUN BLD-MCNC: 27 MG/DL (ref 7–18)
GLUCOSE SERPLBLD-MCNC: 118 MG/DL (ref 74–106)
HCT VFR BLD CALC: 39.1 % (ref 36–45)
INR BLD: 1.16
LIPASE SERPL-CCNC: 107 U/L (ref 73–393)
LYMPHOCYTES # SPEC AUTO: 2.2 K/UL (ref 0.7–4.9)
PMV BLD: 8.1 FL (ref 7.6–11.3)
POTASSIUM SERPL-SCNC: 3.9 MMOL/L (ref 3.5–5.1)
RBC # BLD: 4.37 M/UL (ref 3.86–4.86)
TROPONIN (EMERG DEPT USE ONLY): < 0.02 NG/ML (ref 0–0.04)
UA COMPLETE W REFLEX CULTURE PNL UR: (no result)

## 2019-10-06 PROCEDURE — 90471 IMMUNIZATION ADMIN: CPT

## 2019-10-06 PROCEDURE — 81015 MICROSCOPIC EXAM OF URINE: CPT

## 2019-10-06 PROCEDURE — 81003 URINALYSIS AUTO W/O SCOPE: CPT

## 2019-10-06 PROCEDURE — 97161 PT EVAL LOW COMPLEX 20 MIN: CPT

## 2019-10-06 PROCEDURE — 85610 PROTHROMBIN TIME: CPT

## 2019-10-06 PROCEDURE — 97116 GAIT TRAINING THERAPY: CPT

## 2019-10-06 PROCEDURE — 71045 X-RAY EXAM CHEST 1 VIEW: CPT

## 2019-10-06 PROCEDURE — 85025 COMPLETE CBC W/AUTO DIFF WBC: CPT

## 2019-10-06 PROCEDURE — 84484 ASSAY OF TROPONIN QUANT: CPT

## 2019-10-06 PROCEDURE — 36415 COLL VENOUS BLD VENIPUNCTURE: CPT

## 2019-10-06 PROCEDURE — 80048 BASIC METABOLIC PNL TOTAL CA: CPT

## 2019-10-06 PROCEDURE — 84145 PROCALCITONIN (PCT): CPT

## 2019-10-06 PROCEDURE — 82550 ASSAY OF CK (CPK): CPT

## 2019-10-06 PROCEDURE — 87077 CULTURE AEROBIC IDENTIFY: CPT

## 2019-10-06 PROCEDURE — 80053 COMPREHEN METABOLIC PANEL: CPT

## 2019-10-06 PROCEDURE — 94760 N-INVAS EAR/PLS OXIMETRY 1: CPT

## 2019-10-06 PROCEDURE — 90670 PCV13 VACCINE IM: CPT

## 2019-10-06 PROCEDURE — 87088 URINE BACTERIA CULTURE: CPT

## 2019-10-06 PROCEDURE — 83735 ASSAY OF MAGNESIUM: CPT

## 2019-10-06 PROCEDURE — 96375 TX/PRO/DX INJ NEW DRUG ADDON: CPT

## 2019-10-06 PROCEDURE — 87804 INFLUENZA ASSAY W/OPTIC: CPT

## 2019-10-06 PROCEDURE — 85730 THROMBOPLASTIN TIME PARTIAL: CPT

## 2019-10-06 PROCEDURE — 83690 ASSAY OF LIPASE: CPT

## 2019-10-06 PROCEDURE — 87040 BLOOD CULTURE FOR BACTERIA: CPT

## 2019-10-06 PROCEDURE — 80076 HEPATIC FUNCTION PANEL: CPT

## 2019-10-06 PROCEDURE — 83605 ASSAY OF LACTIC ACID: CPT

## 2019-10-06 PROCEDURE — 87186 SC STD MICRODIL/AGAR DIL: CPT

## 2019-10-06 PROCEDURE — 96366 THER/PROPH/DIAG IV INF ADDON: CPT

## 2019-10-06 PROCEDURE — 93005 ELECTROCARDIOGRAM TRACING: CPT

## 2019-10-06 PROCEDURE — 96365 THER/PROPH/DIAG IV INF INIT: CPT

## 2019-10-06 PROCEDURE — 82962 GLUCOSE BLOOD TEST: CPT

## 2019-10-06 PROCEDURE — 96367 TX/PROPH/DG ADDL SEQ IV INF: CPT

## 2019-10-06 PROCEDURE — 87086 URINE CULTURE/COLONY COUNT: CPT

## 2019-10-06 PROCEDURE — 84100 ASSAY OF PHOSPHORUS: CPT

## 2019-10-06 PROCEDURE — 99285 EMERGENCY DEPT VISIT HI MDM: CPT

## 2019-10-06 RX ADMIN — SODIUM CHLORIDE SCH MLS: 0.9 INJECTION, SOLUTION INTRAVENOUS at 19:00

## 2019-10-06 RX ADMIN — TEMAZEPAM SCH MG: 15 CAPSULE ORAL at 22:15

## 2019-10-06 RX ADMIN — CEFEPIME SCH MLS: 1 INJECTION, POWDER, FOR SOLUTION INTRAMUSCULAR; INTRAVENOUS at 20:53

## 2019-10-06 RX ADMIN — Medication SCH ML: at 20:54

## 2019-10-06 RX ADMIN — SODIUM CHLORIDE SCH: 0.9 INJECTION, SOLUTION INTRAVENOUS at 18:40

## 2019-10-06 NOTE — EDPHYS
Physician Documentation                                                                           

 [facility]                                                                 

Name: Elizabeth Holley                                                                                 

Age: 69 yrs                                                                                       

Sex: Female                                                                                       

: 1950                                                                                   

MRN: [id number]                                                                                   

Arrival Date: 10/06/2019                                                                          

Time: 11:07                                                                                       

Account#: [id number]                                                                            

Bed 14                                                                                            

Private MD:                                                                                       

ED Physician Alf Hargrove                                                                         

HPI:                                                                                              

10/06                                                                                             

12:02 This 69 yrs old  Female presents to ER via Wheelchair with complaints of       jr8 

      Fever, Pain All Over.                                                                       

12:02 Onset: The symptoms/episode began/occurred 2 day(s) ago. Associated signs and symptoms: jr8 

      Pertinent positives: myalgias, Pertinent negatives: abdominal pain, altered mental          

      status, arthralgias, backache, chest pain, headache, runny nose, sinus congestion,          

      sinus drainage, shortness of breath. Severity of symptoms: At their worst the symptoms      

      were moderate. pt states 2 days ago she was working outside mowing and the next day was     

      hurting all over and running low grade fever, denies cough/cold/congestion. .               

                                                                                                  

Historical:                                                                                       

- Allergies:                                                                                      

11:36 No Known Allergies;                                                                     aj1 

- Home Meds:                                                                                      

11:36 Fluoxetine Oral [Active]; levothyroxine oral [Active]; losartan-hydrochlorothiazide     aj1 

      100-12.5 mg Oral tab 1 tab once daily [Active]; Simvastatin Oral [Active]; temazepam        

      Oral [Active];                                                                              

- PMHx:                                                                                           

11:36 Depression; Hyperlipidemia; Hypertension; COPD;                                         aj1 

                                                                                                  

- Immunization history:: Flu vaccine is not up to date.                                           

- Social history:: Smoking status: Patient/guardian denies using tobacco.                         

- Ebola Screening: : Patient denies travel to an Ebola-affected area in the 21 days               

  before illness onset.                                                                           

                                                                                                  

                                                                                                  

ROS:                                                                                              

12:02 Constitutional: Negative for  weight loss, Eyes: Negative for injury, pain, redness,    jr8 

      and discharge, ENT: Negative for injury, pain, and discharge, Neck: Negative for            

      injury, pain, and swelling, Cardiovascular: Negative for chest pain, palpitations, and      

      edema, Respiratory: Negative for shortness of breath, cough, wheezing, and pleuritic        

      chest pain, Abdomen/GI: Negative for abdominal pain, nausea, vomiting, diarrhea, and        

      constipation, Back: Negative for injury and pain, MS/Extremity: Negative for injury and     

      deformity, Neuro: Negative for headache, weakness, numbness, tingling, and seizure.         

12:02 Constitutional: Positive for chills, fatigue, fever, malaise.                               

                                                                                                  

Exam:                                                                                             

12:05 Constitutional:  This is a well developed, well nourished patient who is awake, alert,  jr8 

      and in no acute distress. Head/Face:  Normocephalic, atraumatic. Eyes:  Pupils equal        

      round and reactive to light, extra-ocular motions intact.  Lids and lashes normal.          

      Conjunctiva and sclera are non-icteric and not injected.  Cornea within normal limits.      

      Periorbital areas with no swelling, redness, or edema. ENT:  Nares patent. No nasal         

      discharge, no septal abnormalities noted.  Tympanic membranes are normal and external       

      auditory canals are clear.  Oropharynx with no redness, swelling, or masses, exudates,      

      or evidence of obstruction, uvula midline.  Mucous membranes moist. Neck:  Trachea          

      midline, no thyromegaly or masses palpated, and no cervical lymphadenopathy.  Supple,       

      full range of motion without nuchal rigidity, or vertebral point tenderness.  No            

      Meningismus. Chest/axilla:  Normal chest wall appearance and motion.  Nontender with no     

      deformity.  No lesions are appreciated. Cardiovascular:  Regular rate and rhythm with a     

      normal S1 and S2.  No gallops, murmurs, or rubs.  Normal PMI, no JVD.  No pulse             

      deficits. Respiratory:  Lungs have equal breath sounds bilaterally, clear to                

      auscultation and percussion.  No rales, rhonchi or wheezes noted.  No increased work of     

      breathing, no retractions or nasal flaring. Abdomen/GI:  Soft, non-tender, with normal      

      bowel sounds.  No distension or tympany.  No guarding or rebound.  No evidence of           

      tenderness throughout. MS/ Extremity:  Pulses equal, no cyanosis.  Neurovascular            

      intact.  Full, normal range of motion. Neuro:  Awake and alert, GCS 15, oriented to         

      person, place, time, and situation.  Cranial nerves II-XII grossly intact.  Motor           

      strength 5/5 in all extremities.  Sensory grossly intact.  Cerebellar exam normal.          

      Normal gait.                                                                                

                                                                                                  

Vital Signs:                                                                                      

11:36 BP 91 / 71; Pulse 122; Resp 32; Temp 98.2; Pulse Ox 94% on R/A; Weight 92.99 kg (R);    aj1 

      Height 5 ft. 6 in. (167.64 cm) (R); Pain 9/10;                                              

12:30  / 78; Pulse 87; Resp 31; Pulse Ox 98% on R/A;                                    rb1 

13:30  / 73; Pulse 119; Resp 29; Temp 98.5(O); Pulse Ox 97% on R/A;                     rb1 

14:20  / 89; Pulse 114; Resp 30; Temp 98.9(O); Pulse Ox 98% on R/A; Pain 8/10;          rb1 

15:20  / 82; Pulse 93; Resp 27; Temp 98.1(O); Pulse Ox 97% on R/A;                      rb1 

16:20  / 89; Pulse 114; Resp 21; Pulse Ox 95% on R/A; Pain 7/10;                        rb1 

17:20  / 76; Pulse 106; Resp 31; Temp 98.7(O); Pulse Ox 97% on R/A; Pain 5/10;          rb1 

18:20  / 79; Pulse 104; Resp 31; Temp 98.9(O); Pulse Ox 97% on R/A; Pain 4/10;          rb1 

11:36 Body Mass Index 33.09 (92.99 kg, 167.64 cm)                                             aj1 

                                                                                                  

Dillon Coma Score:                                                                               

14:20 Eye Response: spontaneous(4). Verbal Response: oriented(5). Motor Response: obeys       rb1 

      commands(6). Total: 15.                                                                     

                                                                                                  

MDM:                                                                                              

11:42 Patient medically screened.                                                             8 

15:35 Data reviewed: vital signs, nurses notes, lab test result(s), EKG, radiologic studies,  jr8 

      plain films. Data interpreted: Pulse oximetry: on room air is 98 %. Interpretation:         

      normal. Counseling: I had a detailed discussion with the patient and/or guardian            

      regarding: the historical points, exam findings, and any diagnostic results supporting      

      the discharge/admit diagnosis, lab results, radiology results, the need for further         

      work-up and treatment in the hospital. Physician consultation: Jamir Romo MD was          

      called at 15:37, was contacted at 15:37, regarding admission, to the telemetry unit.        

      consult, patient's condition, and will see patient.                                         

                                                                                                  

10/06                                                                                             

11:40 Order name: Basic Metabolic Panel; Complete Time: 12:57                                 8 

10/06                                                                                             

11:40 Order name: Blood Culture Adult (2)                                                     [facility] 

10/06                                                                                             

11:40 Order name: CBC with Diff; Complete Time: 12:39                                         jr8 

10/06                                                                                             

11:40 Order name: CPK; Complete Time: 12:57                                                   jr8 

10/06                                                                                             

11:40 Order name: Lactate; Complete Time: 12:57                                               jr8 

10/06                                                                                             

11:40 Order name: LFT's; Complete Time: 12:57                                                 jr8 

10/06                                                                                             

11:40 Order name: Lipase; Complete Time: 12:57                                                jr8 

10/06                                                                                             

11:40 Order name: Procalcitonin; Complete Time: 13:08                                         jr8 

10/06                                                                                             

11:40 Order name: Protime (+inr); Complete Time: 12:48                                        jr8 

10/06                                                                                             

11:40 Order name: Ptt, Activated; Complete Time: 12:48                                        jr8 

10/06                                                                                             

11:40 Order name: Troponin (emerg Dept Use Only); Complete Time: 12:57                        jr8 

10/06                                                                                             

11:40 Order name: Urine Microscopic Only; Complete Time: 15:31                                jr8 

10/06                                                                                             

11:42 Order name: Flu; Complete Time: 13:45                                                   jr8 

10/06                                                                                             

13:42 Order name: Urine Dipstick--Ancillary (enter results); Complete Time: 14:07             gm  

10/06                                                                                             

11:40 Order name: Chest Single View XRAY; Complete Time: 16:23                                jr8 

10/06                                                                                             

11:40 Order name: Accucheck; Complete Time: 12:54                                             jr8 

10/06                                                                                             

11:40 Order name: Cardiac monitoring; Complete Time: 13:50                                    jr8 

10/06                                                                                             

11:40 Order name: EKG - Nurse/Tech; Complete Time: 13:50                                      jr8 

10/06                                                                                             

11:40 Order name: IV Saline Lock - Large Bore; Complete Time: 12:55                           jr8 

10/06                                                                                             

11:40 Order name: Labs collected and sent; Complete Time: 12:55                               jr8 

10/06                                                                                             

11:40 Order name: O2 Per Protocol; Complete Time: 12:55                                       jr8 

10/06                                                                                             

11:40 Order name: O2 Sat Monitoring; Complete Time: 12:55                                     jr8 

10/06                                                                                             

11:40 Order name: Urine Dipstick-Ancillary (obtain specimen); Complete Time: 13:50            jr8 

10/06                                                                                             

15:13 Order name: Urine Culture                                                               EDMS

                                                                                                  

Administered Medications:                                                                         

12:45 Drug: NS 0.9% (30 ml/kg) 30 ml/kg Route: IV; Rate: bolus; Site: left antecubital;       rb1 

12:50 Drug: Cefepime 1 grams Route: IVPB; Rate: 200 ml/hr; Infused Over: 30 mins; Site: left  rb1 

      antecubital;                                                                                

13:24 Follow up: IV Status: Completed infusion                                                rb1 

14:10 Drug: vancoMYCIN 1 grams Route: IVPB; Infused Over: 2 hrs; Site: left antecubital;      rb1 

16:12 Follow up: Response: No adverse reaction; IV Status: Completed infusion                 rb1 

14:19 Drug: NS 0.9% (30 ml/kg) 30 ml/kg Route: IV; Rate: bolus; Site: left antecubital;       rb1 

16:43 Drug: Tylenol 1000 mg Route: PO;                                                        rb1 

                                                                                                  

                                                                                                  

Point of Care Testing:                                                                            

      Blood Glucose:                                                                              

12:40 Blood Glucose: 112 mg/dL;                                                               rb1 

      Ranges:                                                                                     

      Critical Glucose Levels:Adult <50 mg/dl or >400 mg/dl  <40 mg/dl or >180 mg/dl       

Disposition:                                                                                      

18:57 Co-signature as Attending Physician, lAf Hargrove MD.                                    rn  

                                                                                                  

Disposition:                                                                                      

10/06/19 15:38 Hospitalization ordered by Jamir Romo for Inpatient Admission. Preliminary      

  diagnosis is Other specified sepsis - urosepsis.                                                

- Bed requested for Telemetry/MedSurg (Inpatient).                                                

- Status is Inpatient Admission.                                                              rb1 

- Condition is Stable.                                                                            

- Problem is new.                                                                                 

- Symptoms have improved.                                                                         

UTI on Admission? Yes                                                                             

                                                                                                  

                                                                                                  

                                                                                                  

Signatures:                                                                                       

Dispatcher [employer]                           EDMS                                                 

Tamia Sanchez RN                     RN   aj1                                                  

Alf Hargrove MD MD rn Roszak, Josh, PA PA   jr8                                                  

Bambi Frank RN                     RN   rb1                                                  

Jordin Richards RN                       RN   ja1                                                  

                                                                                                  

Corrections: (The following items were deleted from the chart)                                    

17:03 15:38 Hospitalization Ordered by Jamir Romo MD for Inpatient Admission. Preliminary   ja1 

      diagnosis is Other specified sepsis - urosepsis. Bed requested for Telemetry/MedSurg        

      (Inpatient). Status is Inpatient Admission. Condition is Stable. Problem is new.            

      Symptoms have improved. UTI on Admission? Yes. jr8                                          

18:37 17:03 10/06/2019 15:38 Hospitalization Ordered by Jamir Romo MD for Inpatient         rb1 

      Admission. Preliminary diagnosis is Other specified sepsis - urosepsis. Bed requested       

      for Telemetry/MedSurg (Inpatient). Status is Inpatient Admission. Condition is Stable.      

      Problem is new. Symptoms have improved. UTI on Admission? Yes. ja1                          

                                                                                                  

**************************************************************************************************

## 2019-10-06 NOTE — RAD REPORT
EXAM DESCRIPTION:  RAD - Chest Single View - 10/6/2019 11:51 am

 

CLINICAL HISTORY:  Dyspnea

 

COMPARISON:  June 11

 

TECHNIQUE:  AP portable chest image was obtained 1147 hours .

 

FINDINGS:  Lungs are underinflated. Lung markings are accentuated by shallow inspiration. Heart and v
asculature are normal. No measurable pleural effusion and no pneumothorax. No acute bony abnormality 
seen. No acute aortic findings suspected.

 

IMPRESSION:  No acute cardiopulmonary process.

## 2019-10-06 NOTE — P.HP
Certification for Inpatient


Patient admitted to: Inpatient


Practitioner: I am a practitioner with admitting privileges, knowledge of 

patient current condition, hospital course, and medical plan of care.


Services: Services provided to patient in accordance with Admission 

requirements found in Title 42 Section 412.3 of the Code of Federal Regulations





Patient History


Date of Service: 10/06/19


Reason for admission: Body aches, chills and fever


History of Present Illness: 





This is a 69-year-old female with past medical history of hyperlipidemia, 

hypertension, depression who presents to the ER for generalized body aches, 

fever and chills for the past 3-4 days.  Patient states the T-max of 100.7 at 

home but worsening chills and worsening generalized weakness associated with 

nausea.  She states that she was working outside in the heat couple days ago 

and then started feeling this way.  She denies any chest pain, shortness of 

breath, headache, vision changes, speech changes, lightheadedness, dizziness, 

GI complaints.  She also currently denies any  complaints of dysuria, 

abdominal pain or back pain.  She is not sure she has had urinary urgency or 

frequency at this time.  She was generally feeling bad therefore she came to 

the ER.


In the ER, blood pressure was 91/71, heart rate of 122, respirations of 32, 

afebrile at 98.2 and satting 94% on room air.  She received IV fluids, 

vancomycin and cefepime.  Her vital signs improved blood pressure of 135/89 and 

heart rate improved to 114. Her labs were remarkable for WBC count elevated at 

17.3, with a left shift, creatinine also elevated to 1.31.  Her pro calcitonin 

and lactate were normal.  Her urinalysis showed 1+ blood, positive nitrites and 

leuk Estrace with many bacteria.  Cultures were sent.


At the time of my exam, she was alert oriented x3, in mild distress due to pain 

and chills.  Her heart rate had improved too high 100s and blood pressure was 

stable in the 120/70's





Allergies





hydrocodone [Hydrocodone] Adverse Reaction (Intermediate, Verified 02/22/12 06:

25)


 Nausea/Vomiting


Hydrocodone-Acetaminop Allergy (Uncoded 06/27/18 16:47)


 Unknown





Home medications list reviewed: Yes


Home Medications: 








Albuterol Sulfate [Proair Hfa] 1 dose IH DAILY 06/27/18 


Fluoxetine HCl [Prozac] 40 mg PO DAILY 06/27/18 


Losartan/Hydrochlorothiazide [Losartan-Hctz 100-12.5 mg Tab] 1 tab PO DAILY 06/ 27/18 


Simvastatin 40 mg PO DAILY 06/27/18 


Temazepam 15 mg PO BEDTIME 06/27/18 


Tramadol HCl [Ultram] 50 mg PO Q6H PRN 06/27/18 


Amlodipine [Norvasc] 5 mg PO DAILY #30 tab 06/28/18 


Levothyroxine [Synthroid*] 75 mcg PO [id number] #30 tab 06/28/18 








- Past Medical/Surgical History


Diabetic: No


-: hypertension


-: hypothyroid


-: anxiety


-: tonsillectomy


-: appendectomy


-: ruptured spleen


-: hysterectomy


-: cyst removal from kidney


-: left hand surgery





- Family History


  ** Father


Notes: aortic aneurysm





- Social History


Alcohol use: No


CD- Drugs: No


Caffeine use: Yes





Review of Systems


10-point ROS is otherwise unremarkable





Physical Examination





- Physical Exam


General: Alert, Oriented x3, Mild distress, Other (unkempt)


HEENT: Atraumatic, PERRLA, Mucous membr. moist/pink, EOMI, Sclerae nonicteric


Neck: Supple, 2+ carotid pulse no bruit, No LAD, Without JVD or thyroid 

abnormality


Respiratory: Clear to auscultation bilaterally, Normal air movement


Cardiovascular: Regular rate/rhythm, Normal S1 S2


Gastrointestinal: Normal bowel sounds, No tenderness


Musculoskeletal: No tenderness


Integumentary: No rashes


Neurological: Normal gait, Normal speech, Normal strength at 5/5 x4 extr, 

Normal tone, Normal affect


Lymphatics: No axilla or inguinal lymphadenopathy





- Studies


Laboratory Data (last 24 hrs)





10/06/19 12:10: PT 13.6 H, INR 1.16, APTT 32.7


10/06/19 12:10: WBC 17.3 H, Hgb 13.2, Hct 39.1, Plt Count 464 H


10/06/19 12:10: Sodium 136, Potassium 3.9, BUN 27 H, Creatinine 1.31 H, Glucose 

118 H, Total Bilirubin 0.5, AST 16, ALT 23, Alkaline Phosphatase 68, Lipase 107





Microbiology Data (last 24 hrs): 








10/06/19 12:07   Nasopharnyx   Influenza Type A Antigen Screen - Final


10/06/19 12:07   Nasopharnyx   Influenza Type B Antigen Screen - Final








Assessment and Plan





- Problems (Diagnosis)


(1) Sepsis


Current Visit: Yes   Status: Acute   


Plan: 


Patient met sepsis criteria on admission with:  Hypotension, elevated WBC count

, tachycardia, end-organ damage.


-patient provided with IV fluids, sepsis protocol in the ER.  Will continue 

with IV hydration


-continue IV antibiotics with cefepime at this time


-culture sent, pending


-monitor via labs


Qualifiers: 


   Sepsis type: sepsis due to unspecified organism 





(2) Urinary tract infection


Current Visit: Yes   Status: Acute   


Plan: 


-see above


-continue antibiotics.  Cultures are pending, will adjust antibiotics as needed


Qualifiers: 


   Urinary tract infection type: acute cystitis   Hematuria presence: with 

hematuria   Qualified Code(s): N30.01 - Acute cystitis with hematuria   





(3) Generalized body aches


Current Visit: Yes   Status: Acute   





(4) Hyperlipidemia


Current Visit: No   Status: Chronic   


Qualifiers: 


   Hyperlipidemia type: unspecified   Qualified Code(s): E78.5 - Hyperlipidemia

, unspecified   





(5) Depression


Current Visit: No   Status: Chronic   


Plan: 


Denies any suicidal or homicidal ideations at this time.  Stable


-Will restart home medication


Qualifiers: 


   Depression Type: major depressive disorder   Major depression recurrence: 

unspecified whether recurrent   Active/Remission status: remission status 

unspecified   Qualified Code(s): F32.9 - Major depressive disorder, single 

episode, unspecified   





(6) Hypertension


Onset Date: 06/28/18   Current Visit: No   Status: Chronic   


Plan: 


We will hold blood pressure medications at this time, due to low blood pressure.


-we will continue to monitor blood pressure restart home medications as needed/

tolerated


Qualifiers: 


   Hypertension type: essential hypertension   Qualified Code(s): I10 - 

Essential (primary) hypertension   





- Plan





DVT prophylaxis:  Lovenox


GI prophylaxis:  None


Diet:  Heart healthy





Disposition:  Pending symptomatic improvement


Discharge Plan: Home


Plan to discharge in: Greater than 2 days





- Advance Directives


Does patient have a Living Will: No


Does patient have a Durable POA for Healthcare: No


Time Spent Managing Pts Care (In Minutes): 55

## 2019-10-06 NOTE — ER
Nurse's Notes                                                                                     

 [facility]                                                                 

Name: Elizabeth Holley                                                                                 

Age: 69 yrs                                                                                       

Sex: Female                                                                                       

: 1950                                                                                   

MRN: [id number]                                                                                   

Arrival Date: 10/06/2019                                                                          

Time: 11:07                                                                                       

Account#: [id number]                                                                            

Bed 14                                                                                            

Private MD:                                                                                       

Diagnosis: Other specified sepsis-urosepsis                                                       

                                                                                                  

Presentation:                                                                                     

10/06                                                                                             

11:31 Presenting complaint: Patient states: "I hurt all over, I feel like I've been beat with aj1 

      a hose, and I've had fever. I have this dog bite on my hand, and I'm concerned about        

      it. I worked in the yard on Wednesday and then on Thursday I couldn't move and I            

      thought it was because I was sore from the yard work. But I still feel awful" Patient       

      reports that she got a dog bite 10 days ago, no redness of swelling noted around bite.      

      Patient denies drainage from dog bite. Patient reports dry cough. Transition of care:       

      patient was not received from another setting of care. Onset of symptoms was . Risk     

      Assessment: Do you want to hurt yourself or someone else? Patient reports no desire to      

      harm self or others. Initial Sepsis Screen: Does the patient meet any 2 criteria? RR >      

      20 per min. HR > 90 bpm. Yes Does the patient have a suspected source of infection?         

      Yes: Other: fever, cough. Care prior to arrival: None.                                      

11:31 Method Of Arrival: Wheelchair                                                           aj1 

11:31 Acuity: VENECIA 2                                                                           aj1 

                                                                                                  

Triage Assessment:                                                                                

11:37 General: Appears uncomfortable, Behavior is calm, cooperative. Pain: Pain currently is  aj1 

      9 out of 10 on a pain scale. Neuro: Level of Consciousness is awake, alert, obeys           

      commands. Cardiovascular: Patient's skin is warm and dry. Respiratory: Airway is patent     

      Respiratory pattern is tachypnea.                                                           

                                                                                                  

Historical:                                                                                       

- Allergies:                                                                                      

11:36 No Known Allergies;                                                                     aj1 

- Home Meds:                                                                                      

11:36 Fluoxetine Oral [Active]; levothyroxine oral [Active]; losartan-hydrochlorothiazide     aj1 

      100-12.5 mg Oral tab 1 tab once daily [Active]; Simvastatin Oral [Active]; temazepam        

      Oral [Active];                                                                              

- PMHx:                                                                                           

11:36 Depression; Hyperlipidemia; Hypertension; COPD;                                         aj1 

                                                                                                  

- Immunization history:: Flu vaccine is not up to date.                                           

- Social history:: Smoking status: Patient/guardian denies using tobacco.                         

- Ebola Screening: : Patient denies travel to an Ebola-affected area in the 21 days               

  before illness onset.                                                                           

                                                                                                  

                                                                                                  

Screenin:45 Abuse screen: Denies threats or abuse. Nutritional screening: No deficits noted.        rb1 

      Nutritional screening: Pt. is currently doing the Keto diet. Tuberculosis screening: No     

      symptoms or risk factors identified. Fall Risk None identified.                             

                                                                                                  

Assessment:                                                                                       

11:45 General: Appears uncomfortable, Behavior is calm, cooperative, Reports fever for. Pain: rb1 

      Complains of pain in generalized Pain currently is 9 out of 10 on a pain scale. Neuro:      

      Level of Consciousness is awake, alert, obeys commands, Oriented to person, place,          

      time, situation. Cardiovascular: Capillary refill < 3 seconds is brisk in bilateral         

      fingers. Respiratory: Airway is patent Respiratory effort is even, unlabored,               

      Respiratory pattern is regular, symmetrical. GI: Reports nausea. : No signs and/or        

      symptoms were reported regarding the genitourinary system. Derm: Skin is pink, warm \T\     

      dry. Musculoskeletal: Range of motion: intact in all extremities.                           

12:45 Reassessment: Patient appears in no apparent distress at this time. No changes from     rb1 

      previously documented assessment.                                                           

13:30 Reassessment: Patient appears in no apparent distress at this time. Patient and/or      rb1 

      family updated on plan of care and expected duration. Pain level reassessed. Patient is     

      alert, oriented x 3, equal unlabored respirations, skin warm/dry/pink. Family at            

      bedside.                                                                                    

14:30 Reassessment: Patient appears in no apparent distress at this time. No changes from     rb1 

      previously documented assessment.                                                           

15:30 Reassessment: Patient appears in no apparent distress at this time. Patient and/or      rb1 

      family updated on plan of care and expected duration. Pain level reassessed. Patient is     

      alert, oriented x 3, equal unlabored respirations, skin warm/dry/pink.                      

16:30 Reassessment: Patient appears in no apparent distress at this time. Pt. requested       rb1 

      medication for a headache. ISA Alegria notified.                                             

17:21 Reassessment: Patient appears in no apparent distress at this time. Patient and/or      rb1 

      family updated on plan of care and expected duration. Pain level reassessed. Patient is     

      alert, oriented x 3, equal unlabored respirations, skin warm/dry/pink. Pain 5/10.           

18:20 Reassessment: Patient appears in no apparent distress at this time. No changes from     rb1 

      previously documented assessment. Called report to REYNA Sheets Patient states feeling          

      better.                                                                                     

                                                                                                  

Vital Signs:                                                                                      

11:36 BP 91 / 71; Pulse 122; Resp 32; Temp 98.2; Pulse Ox 94% on R/A; Weight 92.99 kg (R);    aj1 

      Height 5 ft. 6 in. (167.64 cm) (R); Pain 9/10;                                              

12:30  / 78; Pulse 87; Resp 31; Pulse Ox 98% on R/A;                                    rb1 

13:30  / 73; Pulse 119; Resp 29; Temp 98.5(O); Pulse Ox 97% on R/A;                     rb1 

14:20  / 89; Pulse 114; Resp 30; Temp 98.9(O); Pulse Ox 98% on R/A; Pain 8/10;          rb1 

15:20  / 82; Pulse 93; Resp 27; Temp 98.1(O); Pulse Ox 97% on R/A;                      rb1 

16:20  / 89; Pulse 114; Resp 21; Pulse Ox 95% on R/A; Pain 7/10;                        rb1 

17:20  / 76; Pulse 106; Resp 31; Temp 98.7(O); Pulse Ox 97% on R/A; Pain 5/10;          rb1 

18:20  / 79; Pulse 104; Resp 31; Temp 98.9(O); Pulse Ox 97% on R/A; Pain 4/10;          rb1 

11:36 Body Mass Index 33.09 (92.99 kg, 167.64 cm)                                             aj1 

                                                                                                  

Dillon Coma Score:                                                                               

14:20 Eye Response: spontaneous(4). Verbal Response: oriented(5). Motor Response: obeys       rb1 

      commands(6). Total: 15.                                                                     

                                                                                                  

ED Course:                                                                                        

11:07 Patient arrived in ED.                                                                  as  

11:35 Triage completed.                                                                       aj1 

11:36 Arm band placed on Patient placed in an exam room.                                      aj1 

11:38 Cody Alegria PA is PHCP.                                                               [id number] 

11:38 Alf Hargrove MD is Attending Physician.                                                [email or web address] 

11:45 Patient has correct armband on for positive identification. Bed in low position. Call   rb1 

      light in reach. Side rails up X 1. Cardiac monitor on. Pulse ox on. NIBP on.                

11:51 Chest Single View XRAY In Process Unspecified.                                          EDMS

12:05 Bambi Frank, RN is Primary Nurse.                                                   rb1 

12:35 Missed attempt(s): 22 gauge in left antecubital area. Bleeding controlled, band aid     rb1 

      applied, catheter tip intact.                                                               

12:45 Inserted saline lock: 22 gauge in left antecubital area, using aseptic technique. Blood rb1 

      collected.                                                                                  

13:49 Urine collected: clean catch specimen, clear, Amount Voided: 200mL EKG done, by ED      jd2 

      staff.                                                                                      

15:37 Jamir Romo MD is Hospitalizing Provider.                                            jr8 

18:20 No provider procedures requiring assistance completed. Patient admitted, IV remains in  rb1 

      place.                                                                                      

                                                                                                  

Administered Medications:                                                                         

12:45 Drug: NS 0.9% (30 ml/kg) 30 ml/kg Route: IV; Rate: bolus; Site: left antecubital;       rb1 

12:50 Drug: Cefepime 1 grams Route: IVPB; Rate: 200 ml/hr; Infused Over: 30 mins; Site: left  rb1 

      antecubital;                                                                                

13:24 Follow up: IV Status: Completed infusion                                                rb1 

14:10 Drug: vancoMYCIN 1 grams Route: IVPB; Infused Over: 2 hrs; Site: left antecubital;      rb1 

16:12 Follow up: Response: No adverse reaction; IV Status: Completed infusion                 rb1 

14:19 Drug: NS 0.9% (30 ml/kg) 30 ml/kg Route: IV; Rate: bolus; Site: left antecubital;       rb1 

16:43 Drug: Tylenol 1000 mg Route: PO;                                                        rb1 

                                                                                                  

                                                                                                  

Point of Care Testing:                                                                            

      Blood Glucose:                                                                              

12:40 Blood Glucose: 112 mg/dL;                                                               rb1 

      Ranges:                                                                                     

                                                                                                  

Outcome:                                                                                          

15:38 Decision to Hospitalize by Provider.                                                    [email or web address] 

18:37 Patient left the ED.                                                                    rb1 

18:37 Admitted to Med/surg accompanied by tech, via wheelchair, room 214, with chart, Report  rb1 

      called to  REYNA Sheets. Information from the SB[facility] was given. all questions asked and           

      answered.                                                                                   

18:37 Condition: stable                                                                       rb1 

18:37 Instructed on the need for admit.                                                           

                                                                                                  

Signatures:                                                                                       

Dispatcher [employer]                           EDMS                                                 

Tamia Sanchez RN                     RN   aj1                                                  

Sanaz Campos Josh, PA                        PA   jr8                                                  

Bambi Frank, RN                     RN   rb1                                                  

Yash, Jonika                               jd2                                                  

                                                                                                  

Corrections: (The following items were deleted from the chart)                                    

19:24 18:20 Reassessment: Patient appears in no apparent distress at this time. No changes    rb1 

      from previously documented assessment. Patient states feeling better. rb1                   

                                                                                                  

**************************************************************************************************

## 2019-10-07 LAB
ALBUMIN SERPL BCP-MCNC: 2.6 G/DL (ref 3.4–5)
ALP SERPL-CCNC: 65 U/L (ref 45–117)
ALT SERPL W P-5'-P-CCNC: 25 U/L (ref 12–78)
AST SERPL W P-5'-P-CCNC: 21 U/L (ref 15–37)
BUN BLD-MCNC: 23 MG/DL (ref 7–18)
GLUCOSE SERPLBLD-MCNC: 103 MG/DL (ref 74–106)
HCT VFR BLD CALC: 34.3 % (ref 36–45)
LYMPHOCYTES # SPEC AUTO: 2.7 K/UL (ref 0.7–4.9)
MAGNESIUM SERPL-MCNC: 1.8 MG/DL (ref 1.8–2.4)
MORPHOLOGY BLD-IMP: (no result)
PMV BLD: 7.8 FL (ref 7.6–11.3)
POTASSIUM SERPL-SCNC: 4.4 MMOL/L (ref 3.5–5.1)
RBC # BLD: 3.86 M/UL (ref 3.86–4.86)

## 2019-10-07 RX ADMIN — LOSARTAN POTASSIUM SCH MG: 50 TABLET, FILM COATED ORAL at 09:07

## 2019-10-07 RX ADMIN — CEFEPIME SCH MLS: 1 INJECTION, POWDER, FOR SOLUTION INTRAMUSCULAR; INTRAVENOUS at 21:24

## 2019-10-07 RX ADMIN — TEMAZEPAM SCH MG: 15 CAPSULE ORAL at 21:24

## 2019-10-07 RX ADMIN — Medication SCH ML: at 21:25

## 2019-10-07 RX ADMIN — SODIUM CHLORIDE SCH MLS: 0.9 INJECTION, SOLUTION INTRAVENOUS at 03:58

## 2019-10-07 RX ADMIN — SODIUM CHLORIDE SCH MLS: 0.9 INJECTION, SOLUTION INTRAVENOUS at 14:38

## 2019-10-07 RX ADMIN — LEVOTHYROXINE SODIUM SCH MG: 0.05 TABLET ORAL at 09:07

## 2019-10-07 RX ADMIN — ACETAMINOPHEN PRN MG: 500 TABLET, FILM COATED ORAL at 00:23

## 2019-10-07 RX ADMIN — ACETAMINOPHEN PRN MG: 500 TABLET, FILM COATED ORAL at 11:19

## 2019-10-07 RX ADMIN — ACETAMINOPHEN PRN MG: 500 TABLET, FILM COATED ORAL at 17:49

## 2019-10-07 RX ADMIN — CEFEPIME SCH MLS: 1 INJECTION, POWDER, FOR SOLUTION INTRAMUSCULAR; INTRAVENOUS at 09:06

## 2019-10-07 RX ADMIN — ENOXAPARIN SODIUM SCH MG: 40 INJECTION SUBCUTANEOUS at 09:07

## 2019-10-07 RX ADMIN — Medication SCH ML: at 09:08

## 2019-10-07 NOTE — EKG
Test Date:    2019-10-06               Test Time:    13:31:41

Technician:   VIVEK                                   

                                                     

MEASUREMENT RESULTS:                                       

Intervals:                                           

Rate:         116                                    

GA:           210                                    

QRSD:         88                                     

QT:           288                                    

QTc:          400                                    

Axis:                                                

P:            71                                     

GA:           210                                    

QRS:          33                                     

T:            72                                     

                                                     

INTERPRETIVE STATEMENTS:                                       

                                                     

Sinus tachycardia with 1st degree AV block

Septal infarct, age undetermined

Abnormal ECG

Compared to ECG 06/27/2018 11:59:34

First degree AV block now present

Sinus rhythm no longer present

Atrial premature complex(es) no longer present

Incomplete right bundle-branch block no longer present

Myocardial infarct finding still present



Electronically Signed On 10-07-19 09:48:32 CDT by Bo Bill

## 2019-10-07 NOTE — P.PN
Subjective


Date of Service: 10/07/19


Chief Complaint: Body aches, chills and fever


Subjective: Improving





Patient seen and examined at bedside. No family at bedside. Chart reviewed and 

case discussed with nursing staff. 


No acute events noted overnight. 


Doing better this am. No fever overnight 





Review of Systems


10-point ROS is otherwise unremarkable





Physical Examination





- Vital Signs


Temperature: 99.5 F


Blood Pressure: 123/60


Pulse: 87


Respirations: 20


Pulse Ox (%): 96





- Physical Exam


General: Alert, In no apparent distress, Oriented x3


HEENT: Atraumatic, PERRLA, EOMI


Neck: Supple, JVD not distended


Respiratory: Clear to auscultation bilaterally, Normal air movement


Cardiovascular: Regular rate/rhythm, Normal S1 S2


Gastrointestinal: Normal bowel sounds, No tenderness


Musculoskeletal: No tenderness


Integumentary: No rashes


Neurological: Normal speech, Normal tone, Normal affect


Lymphatics: No axilla or inguinal lymphadenopathy





- Studies


Laboratory Data (last 24 hrs)





10/06/19 12:10: PT 13.6 H, INR 1.16, APTT 32.7


10/06/19 12:10: WBC 17.3 H, Hgb 13.2, Hct 39.1, Plt Count 464 H


10/06/19 12:10: Sodium 136, Potassium 3.9, BUN 27 H, Creatinine 1.31 H, Glucose 

118 H, Total Bilirubin 0.5, AST 16, ALT 23, Alkaline Phosphatase 68, Lipase 107





Microbiology Data (last 24 hrs): 








10/06/19 12:07   Nasopharnyx   Influenza Type A Antigen Screen - Final


10/06/19 12:07   Nasopharnyx   Influenza Type B Antigen Screen - Final








Assessment And Plan





- Current Problems (Diagnosis)


(1) Sepsis


Current Visit: Yes   Status: Acute   


Plan: 


Patient met sepsis criteria on admission with:  Hypotension, elevated WBC count

, tachycardia, end-organ damage. Improved


-patient provided with IV fluids, sepsis protocol in the ER.  Will continue 

with IV hydration


-continue IV antibiotics with cefepime at this time


-culture sent, pending


-monitor via labs


Qualifiers: 


   Sepsis type: sepsis due to unspecified organism 





(2) Urinary tract infection


Current Visit: Yes   Status: Acute   


Plan: 


-see above


-continue antibiotics.  Cultures are pending, will adjust antibiotics as needed


Qualifiers: 


   Urinary tract infection type: acute cystitis   Hematuria presence: with 

hematuria   Qualified Code(s): N30.01 - Acute cystitis with hematuria   





(3) Generalized body aches


Current Visit: Yes   Status: Acute   





(4) Hyperlipidemia


Current Visit: No   Status: Chronic   


Qualifiers: 


   Hyperlipidemia type: unspecified   Qualified Code(s): E78.5 - Hyperlipidemia

, unspecified   





(5) Depression


Current Visit: No   Status: Chronic   


Plan: 


Denies any suicidal or homicidal ideations at this time.  Stable


-Will restart home medication


Qualifiers: 


   Depression Type: major depressive disorder   Major depression recurrence: 

unspecified whether recurrent   Active/Remission status: remission status 

unspecified   Qualified Code(s): F32.9 - Major depressive disorder, single 

episode, unspecified   





(6) Hypertension


Onset Date: 06/28/18   Current Visit: No   Status: Chronic   


Plan: 


We will hold blood pressure medications at this time, due to low blood pressure.


-we will continue to monitor blood pressure restart home medications as needed/

tolerated


Qualifiers: 


   Hypertension type: essential hypertension   Qualified Code(s): I10 - 

Essential (primary) hypertension   





(7) Acute kidney injury


Current Visit: Yes   Status: Resolved   





- Plan





DVT prophylaxis:  Lovenox


GI prophylaxis:  None


Diet:  Heart healthy





Disposition:  Pending symptomatic improvement and cultures. Anticipate 

discharge home in the next 24 hrs. 


Discharge Plan: Home


Plan to discharge in: 24 Hours

## 2019-10-08 VITALS — TEMPERATURE: 97.5 F | DIASTOLIC BLOOD PRESSURE: 74 MMHG | SYSTOLIC BLOOD PRESSURE: 139 MMHG

## 2019-10-08 VITALS — OXYGEN SATURATION: 95 %

## 2019-10-08 LAB
ALBUMIN SERPL BCP-MCNC: 2.6 G/DL (ref 3.4–5)
ALP SERPL-CCNC: 76 U/L (ref 45–117)
ALT SERPL W P-5'-P-CCNC: 29 U/L (ref 12–78)
AST SERPL W P-5'-P-CCNC: 20 U/L (ref 15–37)
BUN BLD-MCNC: 14 MG/DL (ref 7–18)
GLUCOSE SERPLBLD-MCNC: 104 MG/DL (ref 74–106)
HCT VFR BLD CALC: 33.1 % (ref 36–45)
LYMPHOCYTES # SPEC AUTO: 2.3 K/UL (ref 0.7–4.9)
PMV BLD: 8.2 FL (ref 7.6–11.3)
POTASSIUM SERPL-SCNC: 3.4 MMOL/L (ref 3.5–5.1)
RBC # BLD: 3.73 M/UL (ref 3.86–4.86)

## 2019-10-08 RX ADMIN — SODIUM CHLORIDE SCH MLS: 0.9 INJECTION, SOLUTION INTRAVENOUS at 00:02

## 2019-10-08 RX ADMIN — ENOXAPARIN SODIUM SCH MG: 40 INJECTION SUBCUTANEOUS at 08:19

## 2019-10-08 RX ADMIN — SODIUM CHLORIDE SCH: 0.9 INJECTION, SOLUTION INTRAVENOUS at 10:40

## 2019-10-08 RX ADMIN — LOSARTAN POTASSIUM SCH MG: 50 TABLET, FILM COATED ORAL at 08:18

## 2019-10-08 RX ADMIN — CEFEPIME SCH MLS: 1 INJECTION, POWDER, FOR SOLUTION INTRAMUSCULAR; INTRAVENOUS at 08:21

## 2019-10-08 RX ADMIN — LEVOTHYROXINE SODIUM SCH MG: 0.05 TABLET ORAL at 08:17

## 2019-10-08 RX ADMIN — Medication SCH ML: at 08:19

## 2019-10-08 RX ADMIN — ACETAMINOPHEN PRN MG: 500 TABLET, FILM COATED ORAL at 01:32

## 2019-10-08 NOTE — P.DS
Admission Date: 10/06/19


Discharge Date: 10/08/19


Disposition: ROUTINE DISCHARGE


Discharge Condition: GOOD


Reason for Admission: Body aches, chills and fever





- Problems


(1) Sepsis


Status: Acute   


Qualifiers: 


   Sepsis type: sepsis due to unspecified organism 





(2) Urinary tract infection


Status: Acute   


Qualifiers: 


   Urinary tract infection type: acute cystitis   Hematuria presence: with 

hematuria   Qualified Code(s): N30.01 - Acute cystitis with hematuria   





(3) Generalized body aches


Status: Acute   





(4) Hyperlipidemia


Status: Chronic   


Qualifiers: 


   Hyperlipidemia type: unspecified   Qualified Code(s): E78.5 - Hyperlipidemia

, unspecified   





(5) Depression


Status: Chronic   


Qualifiers: 


   Depression Type: major depressive disorder   Major depression recurrence: 

unspecified whether recurrent   Active/Remission status: remission status 

unspecified   Qualified Code(s): F32.9 - Major depressive disorder, single 

episode, unspecified   





(6) Hypertension


Onset Date: 06/28/18   Status: Chronic   


Qualifiers: 


   Hypertension type: essential hypertension   Qualified Code(s): I10 - 

Essential (primary) hypertension   





(7) Acute kidney injury


Status: Resolved   


Brief History of Present Illness: 





This is a 69-year-old female with past medical history of hyperlipidemia, 

hypertension, depression who presents to the ER for generalized body aches, 

fever and chills for the past 3-4 days.  Patient states the T-max of 100.7 at 

home but worsening chills and worsening generalized weakness associated with 

nausea.  She states that she was working outside in the heat couple days ago 

and then started feeling this way.  She denies any chest pain, shortness of 

breath, headache, vision changes, speech changes, lightheadedness, dizziness, 

GI complaints.  She also currently denies any  complaints of dysuria, 

abdominal pain or back pain.  She is not sure she has had urinary urgency or 

frequency at this time.  She was generally feeling bad therefore she came to 

the ER.


In the ER, blood pressure was 91/71, heart rate of 122, respirations of 32, 

afebrile at 98.2 and satting 94% on room air.  She received IV fluids, 

vancomycin and cefepime.  Her vital signs improved blood pressure of 135/89 and 

heart rate improved to 114. Her labs were remarkable for WBC count elevated at 

17.3, with a left shift, creatinine also elevated to 1.31.  Her pro calcitonin 

and lactate were normal.  Her urinalysis showed 1+ blood, positive nitrites and 

leuk Estrace with many bacteria.  Cultures were sent.


At the time of my exam, she was alert oriented x3, in mild distress due to pain 

and chills.  Her heart rate had improved too high 100s and blood pressure was 

stable in the 120/70's


Hospital Course: 


Patient is admitted for sepsis, which improved and resolved throughout the 

stay.  She was provided with IV fluids, IV antibiotics.  Urine cultures were 

positive for Enterobacter aerogenes, sensitive to Bactrim.  Her antibiotics 

were converted to oral Bactrim upon discharge.  She otherwise did well 

throughout the stay.  Her symptoms did improve prior to discharge.  Prior to 

discharge, she was also afebrile, tolerating oral diet and ambulating without 

concerns.  Her acute kidney injury and resolved prior to discharge.


Her diagnoses/treatment plan was explained to her, all questions were answered 

and she verbalized understanding.  She was discharged home in a safe and stable 

manner on oral Bactrim and with instructions to follow up with the primary care 

physician in a couple of days.





Vital Signs/Physical Exam: 














Temp Pulse Resp BP Pulse Ox


 


 97.5 F   78   19   139/74   95 


 


 10/08/19 12:00  10/08/19 12:00  10/08/19 12:00  10/08/19 12:00  10/08/19 12:00








General: Alert, In no apparent distress, Oriented x3


HEENT: Atraumatic, PERRLA, EOMI


Neck: Supple, JVD not distended


Respiratory: Clear to auscultation bilaterally, Normal air movement


Cardiovascular: Regular rate/rhythm, Normal S1 S2


Gastrointestinal: Normal bowel sounds, No tenderness


Musculoskeletal: No tenderness


Integumentary: No rashes


Neurological: Normal speech, Normal tone, Normal affect


Lymphatics: No axilla or inguinal lymphadenopathy


Laboratory Data at Discharge: 














WBC  11.9 K/uL (4.3-10.9)  H D 10/08/19  05:15    


 


Hgb  11.3 g/dL (12.0-15.0)  L  10/08/19  05:15    


 


Hct  33.1 % (36.0-45.0)  L  10/08/19  05:15    


 


Plt Count  449 K/uL (152-406)  H  10/08/19  05:15    


 


PT  13.6 SECONDS (9.5-12.5)  H  10/06/19  12:10    


 


INR  1.16   10/06/19  12:10    


 


APTT  32.7 SECONDS (24.3-36.9)   10/06/19  12:10    


 


Sodium  141 mmol/L (136-145)   10/08/19  05:15    


 


Potassium  3.4 mmol/L (3.5-5.1)  L  10/08/19  05:15    


 


BUN  14 mg/dL (7-18)   10/08/19  05:15    


 


Creatinine  0.89 mg/dL (0.55-1.3)   10/08/19  05:15    


 


Glucose  104 mg/dL ()   10/08/19  05:15    


 


Phosphorus  2.5 mg/dL (2.5-4.9)   10/07/19  05:13    


 


Magnesium  1.8 mg/dL (1.8-2.4)   10/07/19  05:13    


 


Total Bilirubin  0.3 mg/dL (0.2-1.0)   10/08/19  05:15    


 


AST  20 U/L (15-37)   10/08/19  05:15    


 


ALT  29 U/L (12-78)   10/08/19  05:15    


 


Alkaline Phosphatase  76 U/L ()   10/08/19  05:15    


 


Lipase  107 U/L ()   10/06/19  12:10    








Home Medications: 








Albuterol Sulfate [Proair Hfa] 2 puff IN Q4H PRN 10/06/19 


Diphenhydramine [Benadryl*] 25 mg PO BEDTIME PRN 10/06/19 


Levothyroxine Sodium 50 mcg PO 0900 10/06/19 


Losartan/Hydrochlorothiazide [Losartan-Hctz 100-12.5 mg Tab] 1 tab PO DAILY 10/

06/19 


Simvastatin 40 mg PO DAILY 10/06/19 


Temazepam 15 mg PO BEDTIME 10/06/19 


Sulfamethoxazole/Trimethoprim [Bactrim Ds Tablet] 1 each PO BID #6 tablet 10/08/

19 





New Medications: 


Sulfamethoxazole/Trimethoprim [Bactrim Ds Tablet] 1 each PO BID #6 tablet


Patient Discharge Instructions: Please follow up with the primary care 

physician in 2-3 days


Diet: AHA


Activity: [place]


Followup: 


Ray Franklin DO [Primary Care Provider] -  (please call to make an 

appointment. )


Time spent managing pt's care (in minutes): 55

## 2021-04-01 ENCOUNTER — HOSPITAL ENCOUNTER (OUTPATIENT)
Dept: HOSPITAL 97 - ER | Age: 71
Setting detail: OBSERVATION
LOS: 1 days | Discharge: HOME | End: 2021-04-02
Payer: COMMERCIAL

## 2021-04-01 VITALS — BODY MASS INDEX: 36.1 KG/M2

## 2021-04-01 DIAGNOSIS — E78.5: ICD-10-CM

## 2021-04-01 DIAGNOSIS — I12.9: ICD-10-CM

## 2021-04-01 DIAGNOSIS — F32.9: ICD-10-CM

## 2021-04-01 DIAGNOSIS — Z90.81: ICD-10-CM

## 2021-04-01 DIAGNOSIS — Z20.822: ICD-10-CM

## 2021-04-01 DIAGNOSIS — Z88.6: ICD-10-CM

## 2021-04-01 DIAGNOSIS — N18.30: ICD-10-CM

## 2021-04-01 DIAGNOSIS — F41.9: ICD-10-CM

## 2021-04-01 DIAGNOSIS — Z90.710: ICD-10-CM

## 2021-04-01 DIAGNOSIS — E03.9: ICD-10-CM

## 2021-04-01 DIAGNOSIS — J44.1: Primary | ICD-10-CM

## 2021-04-01 LAB
ALBUMIN SERPL BCP-MCNC: 3.7 G/DL (ref 3.4–5)
ALP SERPL-CCNC: 73 U/L (ref 45–117)
ALT SERPL W P-5'-P-CCNC: 30 U/L (ref 12–78)
AST SERPL W P-5'-P-CCNC: 23 U/L (ref 15–37)
BUN BLD-MCNC: 21 MG/DL (ref 7–18)
GLUCOSE SERPLBLD-MCNC: 99 MG/DL (ref 74–106)
HCT VFR BLD CALC: 38.7 % (ref 36–45)
INR BLD: 0.91
LYMPHOCYTES # SPEC AUTO: 4.3 K/UL (ref 0.7–4.9)
MAGNESIUM SERPL-MCNC: 2.3 MG/DL (ref 1.8–2.4)
NT-PROBNP SERPL-MCNC: 95 PG/ML (ref ?–125)
PMV BLD: 7.7 FL (ref 7.6–11.3)
POTASSIUM SERPL-SCNC: 4 MMOL/L (ref 3.5–5.1)
RBC # BLD: 4.34 M/UL (ref 3.86–4.86)
TROPONIN (EMERG DEPT USE ONLY): 0.02 NG/ML (ref 0–0.04)

## 2021-04-01 PROCEDURE — 36415 COLL VENOUS BLD VENIPUNCTURE: CPT

## 2021-04-01 PROCEDURE — 96375 TX/PRO/DX INJ NEW DRUG ADDON: CPT

## 2021-04-01 PROCEDURE — 93005 ELECTROCARDIOGRAM TRACING: CPT

## 2021-04-01 PROCEDURE — 83735 ASSAY OF MAGNESIUM: CPT

## 2021-04-01 PROCEDURE — 96365 THER/PROPH/DIAG IV INF INIT: CPT

## 2021-04-01 PROCEDURE — 94640 AIRWAY INHALATION TREATMENT: CPT

## 2021-04-01 PROCEDURE — 71275 CT ANGIOGRAPHY CHEST: CPT

## 2021-04-01 PROCEDURE — 81003 URINALYSIS AUTO W/O SCOPE: CPT

## 2021-04-01 PROCEDURE — 80076 HEPATIC FUNCTION PANEL: CPT

## 2021-04-01 PROCEDURE — 85025 COMPLETE CBC W/AUTO DIFF WBC: CPT

## 2021-04-01 PROCEDURE — 99285 EMERGENCY DEPT VISIT HI MDM: CPT

## 2021-04-01 PROCEDURE — 83880 ASSAY OF NATRIURETIC PEPTIDE: CPT

## 2021-04-01 PROCEDURE — 80053 COMPREHEN METABOLIC PANEL: CPT

## 2021-04-01 PROCEDURE — 80048 BASIC METABOLIC PNL TOTAL CA: CPT

## 2021-04-01 PROCEDURE — 84484 ASSAY OF TROPONIN QUANT: CPT

## 2021-04-01 PROCEDURE — 85610 PROTHROMBIN TIME: CPT

## 2021-04-01 PROCEDURE — 84145 PROCALCITONIN (PCT): CPT

## 2021-04-01 PROCEDURE — 71046 X-RAY EXAM CHEST 2 VIEWS: CPT

## 2021-04-01 NOTE — ER
Nurse's Notes                                                                                     

 [facility]                                                                 

Name: Elizabeth Holley                                                                                 

Age: 70 yrs                                                                                       

Sex: Female                                                                                       

: 1950                                                                                   

MRN: [id number]                                                                                   

Arrival Date: 2021                                                                          

Time: 16:17                                                                                       

Account#: [id number]                                                                            

Bed 18                                                                                            

Private MD:                                                                                       

Diagnosis: Pneumonia due to other specified infectious organisms;Orthopnea                        

                                                                                                  

Presentation:                                                                                     

                                                                                             

16:22 Chief complaint: Patient states: SOB with exertion months ago, feeling exhausted. Has   ca1 

      been progressively difficult to breathe, that I can't even walk a short distance            

      without having trouble breathing. Had X-ray Monday, my doctor looked them up today and      

      was sent here to the ER. Reports chest heaviness and pressure off and on for weeks.         

      Coronavirus screen: Client denies travel out of the [place] in the last 14 days.               

      difficulty breathing, shortness of breath, Client presents with at least one sign or        

      symptom that may indicate coronavirus-19. Standard/surgical mask placed on the client.      

      Provider contacted for isolation considerations. Ebola Screen: Patient negative for         

      fever greater than or equal to 101.5 degrees Fahrenheit, and additional compatible          

      Ebola Virus Disease symptoms Patient denies exposure to infectious person. Patient          

      denies travel to an Ebola-affected area in the 21 days before illness onset. No             

      symptoms or risks identified at this time. Initial Sepsis Screen: Does the patient meet     

      any 2 criteria? No. Patient's initial sepsis screen is negative. Does the patient have      

      a suspected source of infection? No. Patient's initial sepsis screen is negative. Risk      

      Assessment: Do you want to hurt yourself or someone else? Patient reports no desire to      

      harm self or others.                                                                        

16:22 Method Of Arrival: Ambulatory                                                           ca1 

16:22 Acuity: VENECIA 2                                                                           ca1 

16:22 Onset of symptoms was 2021.                                                   ca1 

                                                                                                  

Triage Assessment:                                                                                

19:30 Respiratory: Onset: The symptoms/episode began/occurred gradually, the patient reports  wh  

      symptoms have resolved.                                                                     

                                                                                                  

Historical:                                                                                       

- Allergies:                                                                                      

16:28 No Known Allergies;                                                                     ca1 

- PMHx:                                                                                           

16:28 COPD; Depression; Hyperlipidemia; Hypertension; kidney Disease Stage III;               ca1 

- PSHx:                                                                                           

16:28 spleenectomy; Appendectomy; Hysterectomy;                                               ca1 

                                                                                                  

- Immunization history:: Pneumococcal vaccine is up to date, Flu vaccine is not up to             

  date.                                                                                           

- Social history:: Smoking status: Patient denies any tobacco usage or history of.                

                                                                                                  

                                                                                                  

Screenin:56 Abuse screen: Denies threats or abuse. Nutritional screening: No deficits noted.        vg1 

      Tuberculosis screening: No symptoms or risk factors identified. Fall Risk No fall in        

      past 12 months (0 pts). No secondary diagnosis (0 pts). IV access (20 points).              

      Ambulatory Aid- None/Bed Rest/Nurse Assist (0 pts). Gait- Normal/Bed Rest/Wheelchair (0     

      pts) Mental Status- Oriented to own ability (0 pts). Total Leyva Fall Scale indicates       

      No Risk (0-24 pts).                                                                         

                                                                                                  

Assessment:                                                                                       

17:54 General: Appears in no apparent distress. comfortable, Behavior is calm, cooperative.   vg1 

      Pain: Denies pain. Neuro: Level of Consciousness is awake, alert, obeys commands,           

      Oriented to person, place, time, situation. Cardiovascular: Capillary refill < 3            

      seconds in bilateral fingers. Respiratory: Reports shortness of breath on exertion          

      cough that is dry, Airway is patent Respiratory effort is even, unlabored, Respiratory      

      pattern is regular, symmetrical, Breath sounds are clear bilaterally. GI: No signs          

      and/or symptoms were reported involving the gastrointestinal system. : No signs           

      and/or symptoms were reported regarding the genitourinary system. EENT: No signs and/or     

      symptoms were reported regarding the EENT system. Derm: Skin is intact, Skin is pink,       

      warm \T\ dry. Musculoskeletal: Circulation, motion, and sensation intact.                   

19:22 General: Appears in no apparent distress. Behavior is calm, cooperative, appropriate    wh  

      for age. Pain: Denies pain. Neuro: Level of Consciousness is awake, alert, obeys            

      commands, Oriented to person, place, time, situation, Appropriate for age.                  

      Cardiovascular: Heart tones S1 S2 Rhythm is regular. Respiratory: Airway is patent          

      Respiratory effort is even, unlabored, Respiratory pattern is regular, symmetrical,         

      Breath sounds are clear bilaterally. GI: No signs and/or symptoms were reported             

      involving the gastrointestinal system. : No signs and/or symptoms were reported           

      regarding the genitourinary system. EENT: No signs and/or symptoms were reported            

      regarding the EENT system. Derm: Skin is intact, is healthy with good turgor, Skin is       

      pink, warm \T\ dry. normal. Musculoskeletal: Circulation, motion, and sensation intact.     

20:45 Reassessment: Patient appears in no apparent distress at this time. No changes from     wh  

      previously documented assessment. Patient and/or family updated on plan of care and         

      expected duration. Pain level reassessed. Patient is alert, oriented x 3, equal             

      unlabored respirations, skin warm/dry/pink.                                                 

22:00 Reassessment: Patient appears in no apparent distress at this time. Patient and/or      wh  

      family updated on plan of care and expected duration. Pain level reassessed. Patient is     

      alert, oriented x 3, equal unlabored respirations, skin warm/dry/pink. Provider at          

      bedside explaining POC need for admit.                                                      

23:15 Reassessment: Patient appears in no apparent distress at this time. Patient and/or      wh  

      family updated on plan of care and expected duration. Pain level reassessed. Patient is     

      alert, oriented x 3, equal unlabored respirations, skin warm/dry/pink.                      

                                                                                                  

Vital Signs:                                                                                      

16:22 Pulse 100; Resp 24 S; Temp 97.3(TE); Pulse Ox 97% on R/A; Weight 104.33 kg (R); Height  ca1 

      5 ft. 6 in. (167.64 cm) (R); Pain 0/10;                                                     

16:29  / 66;                                                                            ca1 

17:57  / 77; Pulse 67; Resp 16; Pulse Ox 100% on R/A;                                   vg1 

19:23  / 74; Pulse 64; Resp 17; Pulse Ox 96% on R/A;                                    wh  

21:45  / 82; Pulse 105; Resp 18; Pulse Ox 96% on R/A;                                     

23:30  / 66; Pulse 96; Resp 18; Pulse Ox 94% on R/A;                                      

16:22 Body Mass Index 37.12 (104.33 kg, 167.64 cm)                                            ca1 

                                                                                                  

ED Course:                                                                                        

16:17 Patient arrived in ED.                                                                  ds1 

16:27 Triage completed.                                                                       ca1 

16:28 Arm band placed on right wrist.                                                         ca1 

16:45 Chest Pa And Lat (2 Views) XRAY In Process Unspecified.                                 EDMS

17:43 Endy Rosado PA is PHCP.                                                                cp  

17:43 Deny Yang MD is Attending Physician.                                              cp  

17:54 Tere Manzano, RN is Primary Nurse.                                                  vg1 

17:57 Patient has correct armband on for positive identification. Bed in low position. Call   vg1 

      light in reach. Side rails up X 1.                                                          

19:06 Endy Finn MD is Attending Physician.                                             cp  

20:00 Inserted saline lock: 22 gauge in left forearm, using aseptic technique. Blood          wh  

      collected.                                                                                  

20:06 Radiology exam delayed due to IV insertion attempt and/or patient not having            vm2 

      appropriate IV at this time.                                                                

20:27 CT Chest For PE Angio In Process Unspecified.                                           EDMS

22:05 Vj Hargrove MD is Hospitalizing Provider.                                           cp  

23:10 Primary Nurse role handed off by Tere Manzano RN                                   mw2 

23:22 Elaina Hernandez RN is Primary Nurse.                                                       

23:42 No provider procedures requiring assistance completed. Patient admitted, IV remains in    

      place.                                                                                      

                                                                                                  

Administered Medications:                                                                         

20:17 Drug: Albuterol 2.5 mg Route: Inhalation;                                                 

20:17 Drug: AtroVENT Aerosol 0.5 mg Route: Inhalation;                                          

21:19 Drug: SOLU-Medrol 80 mg Route: IVP; Site: left forearm;                                   

23:44 Follow up: Response: No adverse reaction                                                  

21:21 Drug: Rocephin (cefTRIAXone) 1 grams Route: IV; Rate: calculated rate; Site: left         

      forearm;                                                                                    

23:43 Follow up: Response: No adverse reaction; IV Status: Completed infusion                   

21:53 Drug: Zithromax (azithromycin) 500 mg Route: IVPB; Infused Over: 1 hrs; Site: left        

      forearm;                                                                                    

23:43 Follow up: Response: No adverse reaction; IV Status: Completed infusion                   

                                                                                                  

                                                                                                  

Outcome:                                                                                          

22:05 Decision to Hospitalize by Provider.                                                    cp  

23:42 Admitted to Med/surg accompanied by tech, via wheelchair, room 220, with chart, Report    

      called to  Deann ORELLANA                                                                       

23:42 Condition: stable                                                                           

23:42 Instructed on the need for admit.                                                           

0402                                                                                             

00:08 Patient left the ED.                                                                      

                                                                                                  

Signatures:                                                                                       

Dispatcher [employer]                           EDMS                                                 

Lorena Martinez                                ds1                                                  

Endy Rosado PA PA cp McGuire, Victoria                            2                                                  

Elaina Hernandez RN RN                                                      

Kelsy Rucker                            mw2                                                  

Shiela Bean RN RN   [facility]                                                  

Tere Manzano RN                    RN   vg1                                                  

                                                                                                  

Corrections: (The following items were deleted from the chart)                                    

                                                                                             

17:56 17:54 Respiratory: Airway is patent Respiratory effort is even, unlabored, Respiratory  vg1 

      pattern is regular, symmetrical, Breath sounds are clear bilaterally. vg1                   

                                                                                                  

**************************************************************************************************

## 2021-04-01 NOTE — XMS REPORT
Continuity of Care Document

                            Created on:2021



Patient:JACLYN MIDDLETON

Sex:Female

:1950

External Reference #:[id number]





Demographics







                          Address                   [address] [place] [address]

 

                          Home Phone                [phone]

 

                          Email Address             [email or web address]

 

                          Preferred Language        English

 

                          Marital Status            Unknown

 

                          Anglican Affiliation     Unknown

 

                          Race                      Unknown

 

                          Additional Race(s)        White



                                                    Unavailable

 

                          Ethnic Group              Unknown









Author







                          Organization              [facility]

t

 

                          Address                   [address] [place] [address] Dr. Polanco [address] [place] [id number]

 

                          Phone                     [phone]









Care Team Providers







                    Name                Role                Phone

 

                    Lazara Coelho Attending Clinician [phone]









Problems

This patient has no known problems.



Allergies, Adverse Reactions, Alerts

This patient has no known allergies or adverse reactions.



Medications

This patient has no known medications.



Procedures

This patient has no known procedures.



Encounters







        Start   End     Encounter Admission Attending Care    Care    Encounter 

Source



        Date/Time Date/Time Type    Type    Clinicians Facility Department ID   

   

 

        2021 Outpatient         [facility]    134

[id number] 



        00:00:00 00:00:00                 , Bernie                  [id number] 



                                        Lazara                   [phone] 



                                                                [id number]  







Results

This patient has no known results.

## 2021-04-01 NOTE — RAD REPORT
EXAM DESCRIPTION:  RAD - Chest Pa And Lat (2 Views) - 4/1/2021 4:47 pm

 

CLINICAL HISTORY:  SOB

 

COMPARISON:  Portable October 2019, two view exam June 2019

 

TECHNIQUE:  Frontal and lateral views of the chest were obtained.

 

FINDINGS:  The lungs are normal volume. There is an increase in interstitial opacification in each jonathan
ng base compared to the prior study. No vascular engorgement.   Heart size is normal range. Trachea i
s midline. No large consolidation or mass.

 

 No pleural effusion or pneumothorax seen.  No acute bony finding noted.  No aortic abnormality.

 

IMPRESSION:  Bilateral lung base interstitial edema or infiltrate.

## 2021-04-01 NOTE — EDPHYS
Physician Documentation                                                                           

 [facility]                                                                 

Name: Elizabeth Holley                                                                                 

Age: 70 yrs                                                                                       

Sex: Female                                                                                       

: 1950                                                                                   

MRN: [id number]                                                                                   

Arrival Date: 2021                                                                          

Time: 16:17                                                                                       

Account#: [id number]                                                                            

Bed 18                                                                                            

Private MD:                                                                                       

ED Physician Endy Finn                                                                      

HPI:                                                                                              

                                                                                             

18:05 This 70 yrs old  Female presents to ER via Ambulatory with complaints of       cp  

      Shortness Of Breath.                                                                        

18:05 The patient has shortness of breath at rest. Onset: The symptoms/episode began/occurred cp  

      gradually, and became worse 3 day(s) ago. Duration: The symptoms are continuous, and        

      are steadily getting worse. Associated signs and symptoms: Pertinent positives:             

      non-productive cough, chest pressure, Pertinent negatives: fever, vomiting, swelling        

      and/or pain in legs. Severity of symptoms: in the emergency department the symptoms are     

      unchanged despite home interventions.                                                       

                                                                                                  

Historical:                                                                                       

- Allergies:                                                                                      

16:28 No Known Allergies;                                                                     ca1 

- PMHx:                                                                                           

16:28 COPD; Depression; Hyperlipidemia; Hypertension; kidney Disease Stage III;               ca1 

- PSHx:                                                                                           

16:28 spleenectomy; Appendectomy; Hysterectomy;                                               ca1 

                                                                                                  

- Immunization history:: Pneumococcal vaccine is up to date, Flu vaccine is not up to             

  date.                                                                                           

- Social history:: Smoking status: Patient denies any tobacco usage or history of.                

                                                                                                  

                                                                                                  

ROS:                                                                                              

18:10 Constitutional: Negative for body aches, chills, fever, poor PO intake.                 cp  

18:10 Eyes: Negative for injury, pain, redness, and discharge.                                cp  

18:10 Cardiovascular: Positive for chest pressure, Negative for edema, palpitations.          cp  

18:10 ENT: Negative for ear pain, sore throat, difficulty swallowing, difficulty handling     cp  

      secretions.                                                                                 

18:10 Respiratory: Positive for cough, with no reported sputum, orthopnea, shortness of           

      breath, at rest. Negative for wheezing.                                                     

18:10 Abdomen/GI: Negative for abdominal pain, nausea, vomiting, and diarrhea.                    

18:10 Back: Negative for pain at rest, pain with movement.                                        

18:10 Neuro: Negative for altered mental status, headache, syncope, weakness.                     

18:10 All other systems are negative.                                                             

                                                                                                  

Exam:                                                                                             

18:05 ECG was reviewed by the Attending Physician.                                            cp  

18:13 Constitutional: The patient appears in no acute distress, alert, awake,                 cp  

      non-diaphoretic, non-toxic, well developed, well nourished.                                 

18:13 Head/Face:  Normocephalic, atraumatic.                                                  cp  

18:13 Eyes: Periorbital structures: appear normal, Conjunctiva: normal, no exudate, no            

      injection, Sclera: no appreciated abnormality, Lids and lashes: appear normal,              

      bilaterally.                                                                                

18:13 ENT: External ear(s): are unremarkable, Nose: is normal, Mouth: Lips: moist, Oral           

      mucosa: moist, Posterior pharynx: Airway: no evidence of obstruction, patent.               

18:13 Neck: ROM/movement: is normal, is supple, without pain, no range of motions limitations.    

18:13 Chest/axilla: Inspection: normal, Palpation: is normal, no crepitus, no tenderness.         

18:13 Cardiovascular: Rate: normal, Rhythm: regular, Edema: is not appreciated, JVD: is not       

      appreciated.                                                                                

18:13 Respiratory: the patient does not display signs of respiratory distress,  Respirations:     

      labored breathing, is not present, intercostal retractions, are absent, shallow             

      respirations, are not present, Breath sounds: are clear throughout, no decreased breath     

      sounds, no stridor, no wheezing.                                                            

18:13 Abdomen/GI: Inspection: abdomen appears normal, Palpation: abdomen is soft and              

      non-tender, in all quadrants.                                                               

18:13 Back: pain, is absent, ROM is normal.                                                       

18:13 Neuro: Orientation: to person, place \T\ time. Mentation: is normal, Cerebellar function:   

      is grossly normal, Motor: moves all fours, strength is normal, Sensation: is normal.        

                                                                                                  

Vital Signs:                                                                                      

16:22 Pulse 100; Resp 24 S; Temp 97.3(TE); Pulse Ox 97% on R/A; Weight 104.33 kg (R); Height  ca1 

      5 ft. 6 in. (167.64 cm) (R); Pain 0/10;                                                     

16:29  / 66;                                                                            ca1 

17:57  / 77; Pulse 67; Resp 16; Pulse Ox 100% on R/A;                                   vg1 

19:23  / 74; Pulse 64; Resp 17; Pulse Ox 96% on R/A;                                    wh  

21:45  / 82; Pulse 105; Resp 18; Pulse Ox 96% on R/A;                                   wh  

23:30  / 66; Pulse 96; Resp 18; Pulse Ox 94% on R/A;                                    wh  

16:22 Body Mass Index 37.12 (104.33 kg, 167.64 cm)                                            ca1 

                                                                                                  

MDM:                                                                                              

17:48 Patient medically screened.                                                             cp  

21:10 Data reviewed: vital signs, nurses notes, lab test result(s), EKG, radiologic studies,  cp  

      CT scan, plain films.                                                                       

21:10 Test interpretation: by ED physician or midlevel provider: ECG, plain radiologic        cp  

      studies.                                                                                    

                                                                                                  

                                                                                             

18:02 Order name: Basic Metabolic Panel; Complete Time: 19:48                                 cp  

                                                                                             

19:48 Interpretation: Normal except: BUN 21; GFR 53.                                          cp  

                                                                                             

18:02 Order name: CBC with Diff; Complete Time: 19:48                                         cp  

                                                                                             

19:48 Interpretation: Normal except: WBC 14.10; ; NEUT A 8.3.                          cp  

                                                                                             

18:02 Order name: LFT's; Complete Time: 19:48                                                 cp  

                                                                                             

20:54 Interpretation: Normal except: GLOB 4.1; A/G 0.9.                                       cp  

                                                                                             

18:02 Order name: Magnesium; Complete Time: 19:48                                             cp  

                                                                                             

18:02 Order name: NT PRO-BNP; Complete Time: 19:48                                            cp  

                                                                                             

20:55 Interpretation: Within normal limits: NT PRO-BNP 95.                                    cp  

                                                                                             

18:02 Order name: PT-INR; Complete Time: 19:48                                                cp  

                                                                                             

16:29 Order name: Chest Pa And Lat (2 Views) XRAY; Complete Time: 19:04                       ca1 

                                                                                             

19:05 Interpretation: Report reviewed.                                                        cp  

                                                                                             

18:02 Order name: Troponin (emerg Dept Use Only); Complete Time: 19:48                        cp  

                                                                                             

18:20 Order name: Urine Dipstick-Ancillary; Complete Time: 19:04                              EDMS

                                                                                             

19:40 Order name: SARS-COV-2 RT PCR; Complete Time: 19:48                                     EDMS

                                                                                             

19:50 Order name: CT Chest For PE Angio; Complete Time: 20:52                                 cp  

                                                                                             

16:29 Order name: EKG - Nurse/Tech; Complete Time: 16:34                                      ca1 

                                                                                             

16:29 Order name: EKG; Complete Time: 16:29                                                   ca1 

                                                                                             

18:02 Order name: Cardiac monitoring; Complete Time: 18:19                                    cp  

                                                                                             

18:02 Order name: IV Saline Lock; Complete Time: 20:11                                        cp  

                                                                                             

18:02 Order name: Labs collected and sent; Complete Time: 18:34                               cp  

                                                                                             

18:02 Order name: O2 Per Protocol; Complete Time: 18:19                                       cp  

                                                                                             

18:02 Order name: O2 Sat Monitoring; Complete Time: 18:19                                     cp  

                                                                                             

18:02 Order name: Urine Dipstick-Ancillary (obtain specimen); Complete Time: 18:19            cp  

                                                                                                  

EC:05 Rate is 91 beats/min. Rhythm is regular. MN interval is normal. QRS interval is normal.   

      T waves are Inverted in lead aVL. Interpreted by me. Reviewed by me.                        

                                                                                                  

Administered Medications:                                                                         

20:17 Drug: Albuterol 2.5 mg Route: Inhalation;                                                 

20:17 Drug: AtroVENT Aerosol 0.5 mg Route: Inhalation;                                          

21:19 Drug: SOLU-Medrol 80 mg Route: IVP; Site: left forearm;                                   

23:44 Follow up: Response: No adverse reaction                                                  

21:21 Drug: Rocephin (cefTRIAXone) 1 grams Route: IV; Rate: calculated rate; Site: left         

      forearm;                                                                                    

23:43 Follow up: Response: No adverse reaction; IV Status: Completed infusion                   

21:53 Drug: Zithromax (azithromycin) 500 mg Route: IVPB; Infused Over: 1 hrs; Site: left        

      forearm;                                                                                    

23:43 Follow up: Response: No adverse reaction; IV Status: Completed infusion                   

                                                                                                  

                                                                                                  

Disposition:                                                                                      

                                                                                             

08:13 Co-signature as Attending Physician, Endy Finn MD I agree with the assessment and  [facility] 

      plan of care.                                                                               

                                                                                                  

Disposition:                                                                                      

21 22:05 Hospitalization ordered by Vj Hargrove for Observation. Preliminary             

  diagnosis are Pneumonia due to other specified infectious organisms,                            

  Orthopnea.                                                                                      

- Bed requested for Telemetry/MedSurg (observation).                                              

- Status is Observation.                                                                        

- Condition is Stable.                                                                            

- Problem is new.                                                                                 

- Symptoms have improved.                                                                         

                                                                                                  

                                                                                                  

                                                                                                  

Signatures:                                                                                       

Dispatcher [employer]                           Endy Thibodeaux MD MD cha Page, Corey, PA PA cp Garcia, Cindy, Elaina Oseguera RN, RN RN                                                      

Shiela Bean RN RN   ca1                                                  

                                                                                                  

Corrections: (The following items were deleted from the chart)                                    

                                                                                             

20:13 18:02 CORONAVIRUS+MR.LAB.BRZ ordered. EDMS                                              EDMS

23:07 22:05 Hospitalization Ordered by Vj Hargrove MD for Observation. Preliminary          cg  

      diagnosis is Pneumonia due to other specified infectious organisms; Orthopnea. Bed          

      requested for Telemetry/MedSurg (observation). Status is Observation. Condition is          

      Stable. Problem is new. Symptoms have improved. cp                                          

                                                                                             

00:08 04 23:07 2021 22:05 Hospitalization Ordered by Vj Hargrove MD for              

      Observation. Preliminary diagnosis is Pneumonia due to other specified infectious           

      organisms; Orthopnea. Bed requested for Telemetry/MedSurg (observation). Status is          

      Observation. Condition is Stable. Problem is new. Symptoms have improved. cg                

                                                                                                  

**************************************************************************************************

## 2021-04-01 NOTE — RAD REPORT
EXAM DESCRIPTION:  CT - Chest For Pe Angio - 4/1/2021 8:27 pm

 

CLINICAL HISTORY:   SOB

 

COMPARISON:  CTANGIO CHEST FOR PE dated 2/22/2012; Chest Pa And Lat (2 Views) dated 4/1/2021

 

TECHNIQUE:  Dynamically enhanced 3 mm thick images of the chest were obtained during administration o
f approximately 150mL Isovue 370 IV contrast. Coronal and oblique MIP reconstruction images were gene
rated and reviewed. Exam utilizes a protocol to evaluate the pulmonary arterial tree.

 

All CT scans are performed using dose optimization technique as appropriate and may include automated
 exposure control or mA/KV adjustment according to patient size.

 

FINDINGS:  No pulmonary emboli are identified.

 

The aorta as imaged shows no acute or suspicious finding. No pericardial thickening or effusion.

 

No focal mass or consolidation. Interstitial markings are mildly prominent in each lung base. Finding
 is minimal but could be a mild edema or infiltrate. . No pleural effusion or pleural thickening.

 

No mediastinal or hilar suspicious masses. No chest wall masses or abnormal axillary lymphadenopathy.


 

IMPRESSION:  No pulmonary emboli identified.

 

Mild interstitial prominence in each lung base. This could be mild edema or infiltrate.

## 2021-04-01 NOTE — P.HP
Certification for Inpatient


Patient admitted to: Observation


With expected LOS: <2 Midnights


Patient will require the following post-hospital care: None


Practitioner: I am a practitioner with admitting privileges, knowledge of 

patient current condition, hospital course, and medical plan of care.


Services: Services provided to patient in accordance with Admission requirements

found in Title 42 Section 412.3 of the Code of Federal Regulations





<ViryTa - Last Filed: 04/01/21 23:37>





Patient History


Date of Service: 04/01/21


Primary Care Provider: Bernie Butterfield


Reason for admission: COPD exacerbation


History of Present Illness: 


70-year-old  female with history of hypertension, suspected underlying 

COPD, CKD 3 presents emergency department for shortness of breath.  Patient 

reports that she has had increasing shortness of breath over the course of the 

last month with significant dyspnea on exertion and resting shortness of breath.

 Initially upon presentation to the emergency department patient noted to have 

expiratory wheezing, was given albuterol/Solu-Medrol and had some improvement 

was still significantly short of breath.  Chest x-ray demonstrates bilateral 

lung base interstitial edema versus infiltrate CT chest interstitial markings 

mildly prominent each lung base finding is minimal but could be mild edema or 

infiltrate no pleural effusion or pleural thickening, no PE.  Lab significant 

for white blood cell count 14.1, patient has had splenectomy platelets 532 GFR 

53 which is similar to patient's baseline, COVID negative.  ED provider wishes 

to admit patient under observation for suspected COPD exacerbation.  Patient has

not been previously diagnosed with COPD but does have a 20 pack-year smoking 

history but quit many years prior. 








- Past Medical/Surgical History


Diabetic: No


-: hypertension


-: hypothyroid


-: anxiety


-: Suspected COPD


-: tonsillectomy


-: appendectomy


-: ruptured spleen


-: hysterectomy


-: cyst removal from kidney


-: left hand surgery


Psychosocial/ Personal History: Patient retired, lives with family





- Family History


  ** Father


Notes: aortic aneurysm





- Social History


Smoking Status: Former smoker


Alcohol use: No


CD- Drugs: No


Caffeine use: Yes


Place of Residence: Home





<Ta Baires - Last Filed: 04/01/21 23:37>


Date of Service: 04/02/21





<Vj Hargrove - Last Filed: 04/02/21 22:04>


Allergies





hydrocodone [Hydrocodone] Adverse Reaction (Intermediate, Verified 02/22/12 

06:25)


   Nausea/Vomiting





Home Medications: 








Albuterol Sulfate [Proair Hfa] 2 puff IN Q4H PRN 10/06/19 


Diphenhydramine [Benadryl*] 25 mg PO BEDTIME PRN 10/06/19 


Levothyroxine Sodium 50 mcg PO 0900 10/06/19 


Losartan/Hydrochlorothiazide [Losartan-Hctz 100-12.5 mg Tab] 1 tab PO DAILY 

10/06/19 


Simvastatin 40 mg PO DAILY 10/06/19 


Temazepam 15 mg PO BEDTIME 10/06/19 


Benzonatate [Tessalon Perle*] 100 mg PO Q6H PRN 5 Days #20 cap 04/02/21 


Fluticasone/Salmeterol [Advair 250-50 Diskus] 1 each IH BID 30 Days #1 

blst.w.dev 04/02/21 


predniSONE [Deltasone*] 10 mg PO BID 10 Days #20 tab 04/02/21 








Review of Systems


10-point ROS is otherwise unremarkable


Respiratory: Cough, Shortness of Breath, SOB with Excertion





<Ta Baires - Last Filed: 04/01/21 23:37>





Physical Examination





- Physical Exam


General: Alert, In no apparent distress


HEENT: Atraumatic, PERRLA, Mucous membr. moist/pink


Neck: Supple, 2+ carotid pulse no bruit, No LAD


Respiratory: Normal air movement, Diminished, Expiratory wheezes


Cardiovascular: Regular rate/rhythm, Normal S1 S2


Gastrointestinal: Normal bowel sounds, No tenderness


Musculoskeletal: No tenderness


Integumentary: No rashes


Neurological: Normal speech, Normal strength at 5/5 x4 extr, Normal tone, Normal

 affect





- Studies


Laboratory Data (last 24 hrs)





04/01/21 18:24: PT 10.5, INR 0.91


04/01/21 18:24: WBC 14.10 H, Hgb 12.7, Hct 38.7, Plt Count 532 H


04/01/21 18:24: Sodium 139, Potassium 4.0, BUN 21 H, Creatinine 1.02, Glucose 

99, Magnesium 2.3  D, Total Bilirubin 0.3, AST 23, ALT 30, Alkaline Phosphatase 

73








<Ta Baires - Last Filed: 04/01/21 23:37>





Assessment and Plan





- Plan


Assessment


Dyspnea, expiratory wheezing suspected underlying COPD with exacerbation


Hypertension


CKD 3





Plan


Dyspnea, expiratory wheezing suspected underlying COPD with exacerbation:  

Continue with scheduled breathing treatments, Solu-Medrol pulmonology consulted.

  Patient with mild interstitial changes noted base of each lungs on CT, will 

continue with antibiotics at this time, pro calcitonin pending, patient with 

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  DVT prophylaxis Lovenox 40 mg 

subcutaneous once daily.


Hypertension:  Continue home med


CKD 3:  Stable, avoid nephrotoxic agents.


Discharge Plan: Home


Plan to discharge in: 24 Hours





- Advance Directives


Does patient have a Living Will: No


Does patient have a Durable POA for Healthcare: No





- Code Status/Comfort Care


Code Status Assessed: Yes (Full code)


Critical Care: No


Time Spent Managing Pts Care (In Minutes): 55





<Ta Baires - Last Filed: 04/01/21 23:37>





- Plan


plan reviewed as noted above


acute COPD exacerbation


-nebs, steroids, antibiotics for now





<Vj Hargrove - Last Filed: 04/02/21 22:04>

## 2021-04-02 VITALS — DIASTOLIC BLOOD PRESSURE: 57 MMHG | SYSTOLIC BLOOD PRESSURE: 122 MMHG

## 2021-04-02 VITALS — TEMPERATURE: 97.6 F

## 2021-04-02 VITALS — OXYGEN SATURATION: 94 %

## 2021-04-02 LAB
ALBUMIN SERPL BCP-MCNC: 3.4 G/DL (ref 3.4–5)
ALP SERPL-CCNC: 67 U/L (ref 45–117)
ALT SERPL W P-5'-P-CCNC: 27 U/L (ref 12–78)
AST SERPL W P-5'-P-CCNC: 19 U/L (ref 15–37)
BLD SMEAR INTERP: 0
BUN BLD-MCNC: 19 MG/DL (ref 7–18)
GIANT PLATELETS BLD QL SMEAR: PRESENT
GLUCOSE SERPLBLD-MCNC: 158 MG/DL (ref 74–106)
HCT VFR BLD CALC: 41.5 % (ref 36–45)
LYMPHOCYTES # SPEC AUTO: 1.5 K/UL (ref 0.7–4.9)
MAGNESIUM SERPL-MCNC: 2.3 MG/DL (ref 1.8–2.4)
MORPHOLOGY BLD-IMP: (no result)
PMV BLD: 7.9 FL (ref 7.6–11.3)
POTASSIUM SERPL-SCNC: 4 MMOL/L (ref 3.5–5.1)
RBC # BLD: 4.66 M/UL (ref 3.86–4.86)

## 2021-04-02 RX ADMIN — METHYLPREDNISOLONE SODIUM SUCCINATE SCH MG: 125 INJECTION, POWDER, FOR SOLUTION INTRAMUSCULAR; INTRAVENOUS at 08:08

## 2021-04-02 RX ADMIN — IPRATROPIUM BROMIDE SCH MG: 0.5 SOLUTION RESPIRATORY (INHALATION) at 07:30

## 2021-04-02 RX ADMIN — IPRATROPIUM BROMIDE SCH MG: 0.5 SOLUTION RESPIRATORY (INHALATION) at 02:00

## 2021-04-02 RX ADMIN — IPRATROPIUM BROMIDE SCH MG: 0.5 SOLUTION RESPIRATORY (INHALATION) at 13:40

## 2021-04-02 RX ADMIN — METHYLPREDNISOLONE SODIUM SUCCINATE SCH MG: 125 INJECTION, POWDER, FOR SOLUTION INTRAMUSCULAR; INTRAVENOUS at 01:01

## 2021-04-02 RX ADMIN — ALBUTEROL SULFATE SCH MG: 2.5 SOLUTION RESPIRATORY (INHALATION) at 13:40

## 2021-04-02 RX ADMIN — ALBUTEROL SULFATE SCH MG: 2.5 SOLUTION RESPIRATORY (INHALATION) at 07:30

## 2021-04-02 RX ADMIN — ALBUTEROL SULFATE SCH MG: 2.5 SOLUTION RESPIRATORY (INHALATION) at 02:00

## 2021-04-02 NOTE — P.CNS
Date of Consult: 04/02/21


Primary Care Provider: Bernie Butterfield


Chief Complaint: COPD exacerbation


History of Present Illness: 


 patient is 70 years of age with a history of hypertension presume COPD she quit

smoking quite some time ago is been having progressive shortness of breath worse

over the last month starting getting worse in the beginning of the year was 

informed that she may have had COPD still coughing has a little shortness of 

breath doing better


Allergies





hydrocodone [Hydrocodone] Adverse Reaction (Intermediate, Verified 02/22/12 

06:25)


   Nausea/Vomiting





Home Medications: 








Albuterol Sulfate [Proair Hfa] 2 puff IN Q4H PRN 10/06/19 


Diphenhydramine [Benadryl*] 25 mg PO BEDTIME PRN 10/06/19 


Levothyroxine Sodium 50 mcg PO 0900 10/06/19 


Losartan/Hydrochlorothiazide [Losartan-Hctz 100-12.5 mg Tab] 1 tab PO DAILY 1

0/06/19 


Simvastatin 40 mg PO DAILY 10/06/19 


Temazepam 15 mg PO BEDTIME 10/06/19 








- Past Medical/Surgical History


Diabetic: No


-: hypertension


-: hypothyroid


-: anxiety


-: Suspected COPD


-: tonsillectomy


-: appendectomy


-: ruptured spleen


-: hysterectomy


-: cyst removal from kidney


-: left hand surgery


Psychosocial/ Personal History: Patient retired, lives with family





- Family History


  ** Father


Notes: aortic aneurysm





- Social History


Smoking Status: Unknown if ever smoked


Alcohol use: No


CD- Drugs: No


Caffeine use: Yes


Place of Residence: Home





Review of Systems


10-point ROS is otherwise unremarkable


General: Weakness


Respiratory: Cough, Shortness of Breath





Physical Examination














Temp Pulse Resp BP Pulse Ox


 


 97.6 F   96 H  16   122/57 L  93 


 


 04/02/21 12:00  04/02/21 12:00  04/02/21 12:00  04/02/21 12:00  04/02/21 12:00








General: Alert, In no apparent distress, Oriented x3


Respiratory: Clear to auscultation bilaterally, Diminished


Cardiovascular: No edema, Normal S1 S2


Gastrointestinal: Normal bowel sounds, Soft and benign


Laboratory Data (last 24 hrs)





04/01/21 18:24: PT 10.5, INR 0.91


04/01/21 18:24: WBC 14.10 H, Hgb 12.7, Hct 38.7, Plt Count 532 H


04/01/21 18:24: Sodium 139, Potassium 4.0, BUN 21 H, Creatinine 1.02, Glucose 

99, Magnesium 2.3  D, Total Bilirubin 0.3, AST 23, ALT 30, Alkaline Phosphatase 

73








- Problems


(1) COPD exacerbation


Current Visit: Yes   Status: Acute   


Plan: 


 patient is 70 years of age former smoker as been complaining of progressive 

dyspnea since the beginning of this year former smoker I suspect that she has 

underlying COPD patient has mild ILD no evidence of pulmonary embolism no and 

evidence of any infection may have underlying diastolic dysfunction I suggest 

discharge on prednisone 10 mg twice a day for 10 days antibiotics are not 

necessary and a long-acting bronchodilator example Advair will follow up in my 

office to do outpatient pulmonary function testing patient does not qualify for 

home O2 labs reviewed stable for discharge

## 2021-04-02 NOTE — P.DS
Admission Date: 04/01/21


Discharge Date: 04/02/21


Primary Care Provider: Bernie Butterfield


Disposition: ROUTINE DISCHARGE


Discharge Condition: GOOD


Reason for Admission: COPD exacerbation


Consultations: 


Pulm - Dr. Callejas





Procedures: 


CXR (4/1): Bilateral lung base interstitial edema or infiltrate.


CTA Chest (4/1): No pulmonary emboli identified. Mild interstitial prominence in

each lung base. This could be mild edema or infiltrate.





Problem List:


acute COPD exacerbation, new diagnosis


Hypertension


CKD 3





Brief History of Present Illness: 


70-year-old  female with history of hypertension, suspected underlying 

COPD, CKD 3 presents emergency department for shortness of breath.  Patient 

reports that she has had increasing shortness of breath over the course of the 

last month with significant dyspnea on exertion and resting shortness of breath.

 Initially upon presentation to the emergency department patient noted to have 

expiratory wheezing, was given albuterol/Solu-Medrol and had some improvement 

was still significantly short of breath.  Chest x-ray demonstrates bilateral 

lung base interstitial edema versus infiltrate CT chest interstitial markings 

mildly prominent each lung base finding is minimal but could be mild edema or 

infiltrate no pleural effusion or pleural thickening, no PE.  Lab significant 

for white blood cell count 14.1, patient has had splenectomy platelets 532 GFR 

53 which is similar to patient's baseline, COVID negative.  ED provider wishes 

to admit patient under observation for suspected COPD exacerbation.  Patient has

not been previously diagnosed with COPD but does have a 20 pack-year smoking 

history but quit many years prior. 





Hospital Course: 


Patient improved and did not require any oxygen during her hospitalization with 

treatment of COPD exacerbation. She was discharged home with a 10 day course of 

prednisone, advair, and to continue her home medications as previously 

prescribed. Pulmonology was consulted and recommended discontinuation of 

antibiotics due to no obvious infection seen on CT.


f/u with Dr. Callejas (Pulm) in ~1-2 weeks. Will undergo formal PFTs





Vital Signs/Physical Exam: 





Physical Exam


General: Alert, In no apparent distress


HEENT: Atraumatic, PERRLA, Mucous membr. moist/pink


Neck: Supple, 2+ carotid pulse no bruit, No LAD


Respiratory: Normal air movement, Diminished at bases, clear to auscultation 

bilaterally


Cardiovascular: Regular rate/rhythm, Normal S1 S2


Gastrointestinal: Normal bowel sounds, No tenderness


Musculoskeletal: No tenderness


Integumentary: No rashes


Neurological: Normal speech, Normal strength at 5/5 x4 extr, Normal tone, Normal

affect











Temp Pulse Resp BP Pulse Ox


 


 97.6 F   96 H  16   122/57 L  93 


 


 04/02/21 12:00  04/02/21 12:00  04/02/21 12:00  04/02/21 12:00  04/02/21 12:00








Laboratory Data at Discharge: 














WBC  12.40 K/uL (4.3-10.9)  H  04/02/21  06:14    


 


Hgb  13.4 g/dL (12.0-15.0)   04/02/21  06:14    


 


Hct  41.5 % (36.0-45.0)   04/02/21  06:14    


 


Plt Count  540 K/uL (152-406)  H  04/02/21  06:14    


 


PT  10.5 SECONDS (9.5-12.5)   04/01/21  18:24    


 


INR  0.91   04/01/21  18:24    


 


Sodium  139 mmol/L (136-145)   04/02/21  06:14    


 


Potassium  4.0 mmol/L (3.5-5.1)   04/02/21  06:14    


 


BUN  19 mg/dL (7-18)  H  04/02/21  06:14    


 


Creatinine  0.90 mg/dL (0.55-1.3)   04/02/21  06:14    


 


Glucose  158 mg/dL ()  H  04/02/21  06:14    


 


Magnesium  2.3 mg/dL (1.8-2.4)   04/02/21  06:14    


 


Total Bilirubin  0.3 mg/dL (0.2-1.0)   04/02/21  06:14    


 


AST  19 U/L (15-37)   04/02/21  06:14    


 


ALT  27 U/L (12-78)   04/02/21  06:14    


 


Alkaline Phosphatase  67 U/L ()   04/02/21  06:14    








Home Medications: 








RX: Albuterol Sulfate [Proair Hfa] 2 puff IN Q4H PRN 10/06/19 


RX: Diphenhydramine [Benadryl*] 25 mg PO BEDTIME PRN 10/06/19 


RX: Levothyroxine Sodium 50 mcg PO 0900 10/06/19 


RX: Losartan/Hydrochlorothiazide [Losartan-Hctz 100-12.5 mg Tab] 1 tab PO DAILY 

10/06/19 


RX: Simvastatin 40 mg PO DAILY 10/06/19 


RX: Temazepam 15 mg PO BEDTIME 10/06/19 


Fluticasone/Salmeterol [Advair 250-50 Diskus] 1 each IH BID 30 Days #1 

blst.w.dev 04/02/21 


RX: Benzonatate [Tessalon Perle*] 100 mg PO Q6H PRN 5 Days #20 cap 04/02/21 


RX: predniSONE [Deltasone*] 10 mg PO BID 10 Days #20 tab 04/02/21 





New Medications: 


Fluticasone/Salmeterol [Advair 250-50 Diskus] 1 each IH BID 30 Days #1 

blst.w.dev


RX: predniSONE [Deltasone*] 10 mg PO BID 10 Days #20 tab


RX: Benzonatate [Tessalon Perle*] 100 mg PO Q6H PRN 5 Days #20 cap


 PRN Reason: Cough


Physician Discharge Instructions: 


You were found to have a COPD exacerbation.  He improved with steroids and 

nebulizers.  Your CT scan did not reveal any infectious source/pneumonia.  You 

are discharged home with a 10 day course of oral steroids and Advair (inhaler 

for COPD).  Please continue your albuterol inhaler and other medications as 

previously prescribed.


Please follow up with Dr. Callejas in the next 1-2 weeks.





Diet: AHA


Activity: [place]


Followup: 


Syd Callejas MD [ACTIVE - CAN ADMIT] - 


OOT,OOT [Primary Care Provider] -

## 2021-10-09 ENCOUNTER — HOSPITAL ENCOUNTER (INPATIENT)
Dept: HOSPITAL 97 - ER | Age: 71
LOS: 3 days | Discharge: HOME | DRG: 917 | End: 2021-10-12
Attending: HOSPITALIST | Admitting: HOSPITALIST
Payer: COMMERCIAL

## 2021-10-09 VITALS — BODY MASS INDEX: 38.6 KG/M2

## 2021-10-09 DIAGNOSIS — I10: ICD-10-CM

## 2021-10-09 DIAGNOSIS — T51.0X2A: ICD-10-CM

## 2021-10-09 DIAGNOSIS — E03.9: ICD-10-CM

## 2021-10-09 DIAGNOSIS — G92.8: ICD-10-CM

## 2021-10-09 DIAGNOSIS — E78.5: ICD-10-CM

## 2021-10-09 DIAGNOSIS — J44.1: ICD-10-CM

## 2021-10-09 DIAGNOSIS — F41.8: ICD-10-CM

## 2021-10-09 DIAGNOSIS — Z20.822: ICD-10-CM

## 2021-10-09 DIAGNOSIS — N17.9: ICD-10-CM

## 2021-10-09 DIAGNOSIS — J69.0: ICD-10-CM

## 2021-10-09 DIAGNOSIS — R57.1: ICD-10-CM

## 2021-10-09 DIAGNOSIS — R40.2432: ICD-10-CM

## 2021-10-09 DIAGNOSIS — J96.01: ICD-10-CM

## 2021-10-09 DIAGNOSIS — F10.129: ICD-10-CM
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
## 2021-10-09 DIAGNOSIS — T42.72XA: Primary | ICD-10-CM

## 2021-10-09 LAB
ALBUMIN SERPL BCP-MCNC: 3.4 G/DL (ref 3.4–5)
ALP SERPL-CCNC: 70 U/L (ref 45–117)
ALT SERPL W P-5'-P-CCNC: 26 U/L (ref 12–78)
AST SERPL W P-5'-P-CCNC: 18 U/L (ref 15–37)
BUN BLD-MCNC: 26 MG/DL (ref 7–18)
COHGB MFR BLDA: 1.2 % (ref 0–1.5)
GLUCOSE SERPLBLD-MCNC: 122 MG/DL (ref 74–106)
HCT VFR BLD CALC: 38.5 % (ref 36–45)
INR BLD: 0.89
LYMPHOCYTES # SPEC AUTO: 3.3 K/UL (ref 0.7–4.9)
METHAMPHET UR QL SCN: NEGATIVE
OXYHGB MFR BLDA: 95 % (ref 94–97)
PMV BLD: 7.2 FL (ref 7.6–11.3)
POTASSIUM SERPL-SCNC: 4.4 MMOL/L (ref 3.5–5.1)
RBC # BLD: 4.18 M/UL (ref 3.86–4.86)
SAO2 % BLDA: 97.2 % (ref 92–98.5)
THC SERPL-MCNC: NEGATIVE NG/ML

## 2021-10-09 PROCEDURE — 36415 COLL VENOUS BLD VENIPUNCTURE: CPT

## 2021-10-09 PROCEDURE — 84100 ASSAY OF PHOSPHORUS: CPT

## 2021-10-09 PROCEDURE — 80048 BASIC METABOLIC PNL TOTAL CA: CPT

## 2021-10-09 PROCEDURE — 80061 LIPID PANEL: CPT

## 2021-10-09 PROCEDURE — 0BH17EZ INSERTION OF ENDOTRACHEAL AIRWAY INTO TRACHEA, VIA NATURAL OR ARTIFICIAL OPENING: ICD-10-PCS

## 2021-10-09 PROCEDURE — 85730 THROMBOPLASTIN TIME PARTIAL: CPT

## 2021-10-09 PROCEDURE — 84439 ASSAY OF FREE THYROXINE: CPT

## 2021-10-09 PROCEDURE — 84443 ASSAY THYROID STIM HORMONE: CPT

## 2021-10-09 PROCEDURE — 51702 INSERT TEMP BLADDER CATH: CPT

## 2021-10-09 PROCEDURE — 80053 COMPREHEN METABOLIC PANEL: CPT

## 2021-10-09 PROCEDURE — 81003 URINALYSIS AUTO W/O SCOPE: CPT

## 2021-10-09 PROCEDURE — 99292 CRITICAL CARE ADDL 30 MIN: CPT

## 2021-10-09 PROCEDURE — 94760 N-INVAS EAR/PLS OXIMETRY 1: CPT

## 2021-10-09 PROCEDURE — 82805 BLOOD GASES W/O2 SATURATION: CPT

## 2021-10-09 PROCEDURE — 83735 ASSAY OF MAGNESIUM: CPT

## 2021-10-09 PROCEDURE — 5A1935Z RESPIRATORY VENTILATION, LESS THAN 24 CONSECUTIVE HOURS: ICD-10-PCS

## 2021-10-09 PROCEDURE — 85610 PROTHROMBIN TIME: CPT

## 2021-10-09 PROCEDURE — 99291 CRITICAL CARE FIRST HOUR: CPT

## 2021-10-09 PROCEDURE — 80329 ANALGESICS NON-OPIOID 1 OR 2: CPT

## 2021-10-09 PROCEDURE — 80076 HEPATIC FUNCTION PANEL: CPT

## 2021-10-09 PROCEDURE — 85025 COMPLETE CBC W/AUTO DIFF WBC: CPT

## 2021-10-09 PROCEDURE — 94002 VENT MGMT INPAT INIT DAY: CPT

## 2021-10-09 PROCEDURE — 31500 INSERT EMERGENCY AIRWAY: CPT

## 2021-10-09 PROCEDURE — 80307 DRUG TEST PRSMV CHEM ANLYZR: CPT

## 2021-10-09 PROCEDURE — 84132 ASSAY OF SERUM POTASSIUM: CPT

## 2021-10-09 PROCEDURE — 71045 X-RAY EXAM CHEST 1 VIEW: CPT

## 2021-10-09 PROCEDURE — 93005 ELECTROCARDIOGRAM TRACING: CPT

## 2021-10-09 PROCEDURE — 80320 DRUG SCREEN QUANTALCOHOLS: CPT

## 2021-10-09 RX ADMIN — FAMOTIDINE SCH MG: 10 INJECTION, SOLUTION INTRAVENOUS at 21:00

## 2021-10-09 RX ADMIN — FENTANYL CITRATE PRN MCG: 50 INJECTION, SOLUTION INTRAMUSCULAR; INTRAVENOUS at 21:43

## 2021-10-09 RX ADMIN — DEXTROSE AND SODIUM CHLORIDE SCH MLS: 5; .45 INJECTION, SOLUTION INTRAVENOUS at 20:00

## 2021-10-09 RX ADMIN — SODIUM CHLORIDE SCH: 9 INJECTION, SOLUTION INTRAVENOUS at 19:45

## 2021-10-09 RX ADMIN — Medication SCH ML: at 21:00

## 2021-10-09 NOTE — ER
Nurse's Notes                                                                                     

 [facility]                                                                 

Name: Elizabeth Holley                                                                                 

Age: 71 yrs                                                                                       

Sex: Female                                                                                       

: 1950                                                                                   

MRN: [id number]                                                                                   

Arrival Date: 10/09/2021                                                                          

Time: 16:59                                                                                       

Account#: [id number]                                                                            

Bed 6                                                                                             

Private MD:                                                                                       

Diagnosis: Suicide attempt;Altered mental status, unspecified;Adverse effect of other             

  antiepileptic and sedative-hypnotic drugs, initial encounter;Pneumonia, unspecified             

  organism                                                                                        

                                                                                                  

Presentation:                                                                                     

10/09                                                                                             

17:00 Chief complaint: EMS states: Possible suicidal attempt/ overdose. Daughter reported to    

      EMS that patient threatened to take her sleeping pills. EMS reports they administered       

      Narcan 2mg IVP with no change. Ebola Screen: Unable to complete the Ebola screening         

      because:. Onset of symptoms is unknown.                                                     

17:00 Method Of Arrival: EMS: [facility]                                                        

17:00 Acuity: VENECIA 1                                                                             

17:00 Chief complaint: EMS states: called out by pt's daughter who stated that her mother     sv  

      called her and stated she was feeling suicidal. She called EMS, pt found to be              

      responsive to painful stimuli. Onset of symptoms is unknown.                                

17:00 Method Of Arrival: EMS: [facility]                                                        

17:00 Acuity: VENECIA 1                                                                             

17:00 Coronavirus screen: At this time, unable to obtain information related to travel        sv  

      outside the [place] Initial Sepsis Screen: Does the patient meet any 2 criteria? Temp          

      <36.0*C (96.8*F)) or > 38.3*C (100.9*F). Altered Mental Status. Yes Does the patient        

      have a suspected source of infection? No. Patient's initial sepsis screen is negative.      

      Risk Assessment: Do you want to hurt yourself or someone else? Unable to obtain.            

                                                                                                  

Triage Assessment:                                                                                

17:00 General: Appears obese, well developed, Behavior is uncooperative, unresponsive. Pain:  sv  

      Unable to use pain scale. Does not appear to understand pain scale. FLACC scale score       

      is 0 out of 10. Patient is unresponsive. Neuro: Level of Consciousness is unresponsive,     

      Oriented to none. Cardiovascular: Patient's skin is warm and dry. Rhythm is sinus           

      tachycardia. Respiratory: Respiratory effort is even, unlabored, Respiratory pattern is     

      symmetrical. Derm: Skin is normal.                                                          

                                                                                                  

Historical:                                                                                       

- Allergies:                                                                                      

17:44 hydrocodone;                                                                            sv  

- PMHx:                                                                                           

17:44 COPD; Hyperlipidemia; Hypertension; kidney Disease Stage III; Depression;               sv  

                                                                                                  

- Immunization history:: Adult Immunizations unknown.                                             

- Social history:: Smoking status: unknown.                                                       

- History obtained from: EMS.                                                                     

- Unable to obtain history due to: altered mental status, unresponsive.                           

                                                                                                  

                                                                                                  

Screenin:04 Abuse screen: unable to complete. Nutritional screening: unable to complete .           sv  

      Tuberculosis screening: unable to complete . Fall Risk No fall in past 12 months (0         

      pts). Secondary diagnosis (15 points) AMS. IV access (20 points). Ambulatory Aid-           

      None/Bed Rest/Nurse Assist (0 pts). Gait- Normal/Bed Rest/Wheelchair (0 pts) Mental         

      Status- Overestimates/Forgets Limitations (15 pts.). Total Leyva Fall Scale indicates       

      High Risk Score (45 or more points). Fall prevention measures have been instituted.         

      Side Rails Up X 2 Placed Close to Nursing Station Frequent Obs/Assessments Occuring.        

                                                                                                  

Assessment:                                                                                       

18:03 General: Appears in no apparent distress. Behavior is intubated and sedated. Neuro:     sv  

      Level of Consciousness is Intubated and sedated. Cardiovascular: Rhythm is sinus            

      bradycardia. Respiratory: Ventilator assessment: ET Tube: 7.5 23 cm at gum line.            

      Ventilator Mode: Assist Control (AC) Tidal Volume: 540 Respiratory Rate: 16 FiO2: 35%       

      HOB > 30 degrees. Patient repositioned to supine position.                                  

18:40 Reassessment: Patient appears in no apparent distress at this time. No changes from     sv  

      previously documented assessment. Pt remains intubated and sedated.                         

20:22 Reassessment: Daughter [id number].                                                      ea  

                                                                                                  

Vital Signs:                                                                                      

17:17 BP 71 / 49; Pulse 94; Resp 14; Temp 96.1(TE); Pulse Ox 96% on Non-rebreather mask;      ss  

17:24 BP 89 / 62; Pulse 77;                                                                   ss  

17:30  / 92; Pulse 81; Resp 15; Pulse Ox 98% ;                                          sv  

17:47 BP 93 / 65; Pulse 62; Resp 18; Pulse Ox 100% ;                                          sv  

17:58 Weight 115 kg;                                                                          sv  

18:00  / 81; Pulse 56; Resp 16; Pulse Ox 100% on 30% FiO2 ETT vent;                     hb  

18:15  / 80; Pulse 55; Resp 17; Pulse Ox 100% on 35% FiO2 ETT vent;                     sv  

18:30  / 82; Pulse 55; Resp 16; Pulse Ox 100% on 35% FiO2 ETT vent;                     sv  

18:45  / 72; Pulse 58; Resp 16; Pulse Ox 99% on R/A;                                    hb  

                                                                                                  

Dillon Coma Score:                                                                               

17:00 Eye Response: to pain(2). Verbal Response: incomprehensible(2). Motor Response:         rn  

      localizes pain(5). Total: 9.                                                                

17:38 Eye Response: none(1). Verbal Response: none(1). Motor Response: withdraws from         rn  

      pain(4). Total: 6.                                                                          

                                                                                                  

ED Course:                                                                                        

16:59 Patient arrived in ED.                                                                  rn  

16:59 Alf Hargrove MD is Attending Physician.                                                rn  

17:18 Assisted provider with intubation using 7.5 mm ETT via oral route. ET tube secured at   ss  

      23cm at the gums. Set up intubation tray. Intubated by Alf Hargrove MD Placement             

      verified by CO2 detector w/ + color change, auscultating bilateral breath sounds,           

      Patient tolerated IS sedated. Tolerated well.                                               

17:20 Patient has correct armband on for positive identification. Bed in low position. Side   sv  

      rails up X2. Cardiac monitor on. Pulse ox on. NIBP on. Head of bed elevated.                

17:20 Arm band placed on.                                                                     sv  

17:43 Amin cath inserted, using sterile technique, 16 Fr., by ED staff, balloon inflated, to sv  

      gravity drainage, urine specimen collected. returned clear yellow urine. Patient            

      tolerated well.                                                                             

17:46 CBC with Diff Sent.                                                                     sv  

17:46 Acetaminophen Sent.                                                                     sv  

17:46 Basic Metabolic Panel Sent.                                                             sv  

17:55 Triage completed.                                                                       ss  

18:10 CXR XRAY In Process Unspecified.                                                        EDMS

18:36 Cristopher Espinosa MD is Hospitalizing Provider.                                           rn  

10/10                                                                                             

08:33 Kofi Estevez, RN is Primary Nurse.                                                5 

10/11                                                                                             

20:04 Primary Nurse role handed off by Kofi Estevez, RN                                 tt3 

                                                                                                  

Administered Medications:                                                                         

10/09                                                                                             

17:10 Drug: NS 0.9% 1000 ml Route: IV; Rate: 1000 ml; Site: right antecubital;                ss  

17:18 Drug: Etomidate 20 mg Route: IVP; Site: right antecubital;                              ss  

17:59 Follow up: Response: No adverse reaction                                                sv  

17:18 Drug: Succinylcholine 120 mg Route: IVP; Site: right antecubital;                       ss  

17:59 Follow up: Response: No adverse reaction                                                sv  

17:30 Drug: fentaNYL (PF) 100 mcg Route: IVP; Site: right femoral;                            sv  

17:59 Follow up: Response: No adverse reaction                                                sv  

17:52 Drug: Versed (midazolam) 2 mg Route: IVP; Site: right femoral;                          sv  

17:58 Drug: Propofol 5 mcg/kg/min Route: IV; Rate: calculated rate; Site: right femoral;      sv  

20:03 Follow up: Response: No adverse reaction; IV Status: Infusion continued upon admission  jb4 

17:58 Drug: Levophed (norepinephrine) (4 mg/250 mL D5W 4 mcg/min {Note: started at 5mcg/ml.}  sv  

      Route: IV; Rate: calculated rate; Site: right femoral;                                      

20:03 Follow up: Response: No adverse reaction; IV Status: Infusion continued upon admission  jb4 

18:45 Drug: Zosyn (piperacillin-tazobactam) 3.375 grams Route: IVPB; Infused Over: 60 mins;   sv  

      Site: right femoral;                                                                        

19:45 Follow up: Response: No adverse reaction; IV Status: Completed infusion; IV Intake:     jb4 

      100ml                                                                                       

                                                                                                  

                                                                                                  

Intake:                                                                                           

19:45 IV: 100ml; Total: 100ml.                                                                jb4 

                                                                                                  

Ventilator:                                                                                       

18:00 Fi02: 35%; Rate: 16min; T.V.: 540ml; Peep: 0cm; Mode: CMV; ET tube: 7.5 fr (Oral);      sv  

                                                                                                  

Outcome:                                                                                          

18:37 Decision to Hospitalize by Provider.                                                    dayana  

10/12                                                                                             

16:53 Patient left the ED.                                                                    iw  

                                                                                                  

Signatures:                                                                                       

Dispatcher [employer]                           Yaa Pierson RN RN                                                      

Kathryn Danielle RN RN                                                      

Alf Hargrove MD MD rn Smirch, Shelby, RN RN                                                      

Kristal Solano RN RN hb Bryson, James, RN                       RN   jb4                                                  

Jing Marin RN RN ea Trim, Tyler                                  3                                                  

Kofi Estevez RN                  RN   ch5                                                  

                                                                                                  

Corrections: (The following items were deleted from the chart)                                    

10/09                                                                                             

17:59 17:00 Chief complaint: EMS states: Possible suicidal attempt/ overdose. Daughter        ss  

      reported to EMS that patient threatened to take her sleeping pills. ss                      

                                                                                                  

**************************************************************************************************

## 2021-10-09 NOTE — P.HP
Certification for Inpatient


Patient admitted to: Inpatient


With expected LOS: >2 Midnights


Patient will require the following post-hospital care: None


Practitioner: I am a practitioner with admitting privileges, knowledge of 

patient current condition, hospital course, and medical plan of care.


Services: Services provided to patient in accordance with Admission requirements

found in Title 42 Section 412.3 of the Code of Federal Regulations





Patient History


Date of Service: 10/09/21


Reason for admission: suicide attempt


History of Present Illness: 


Ms. Holley is a 70 yo F with anxiety and depression, hypertension, hypothyroidism 

and copd who was brought in by EMS for suicide attempt and overdose. Patient 

told police that she took sleeping medications and sedatives in order to kill 

herself. EMS reports she was initially more alert but had a progressive decrease

in mental status. She was given 2mg of Narcan by EMS without improvement. 

Patient has attempted suicide in the past with ambien and alcohol. EMS did not 

find pills near or around the patient upon arrival so unknown exactly what she 

took. Patient presented with a GCS of 8 or 9 which decreased to 6 or 7 so ER 

physician intubated the patient and placed a central line. She was also 

hypotensive and hypoxic and was initially started on levophed and propofol. WBC 

16.2. BUN 26, Cr 1.34, GFR 39. Alcohol 317.                





CXR FINDINGS:  Portable technique limits examination quality.


 


Tip of the endotracheal tube is at the level of the superior margin of the 

aortic arch, considered properly placed. Enteric tube descends in the stomach. 

Mild bilateral interstitial opacities, greatest on the left in the left lung 

base likely indicates infection/pneumonia.The heart is upper limit normal size.





Allergies





hydrocodone [Hydrocodone] Adverse Reaction (Intermediate, Verified 02/22/12 

06:25)


   Nausea/Vomiting





Home Medications: 








Albuterol Sulfate [Proair Hfa] 2 puff IN Q4H PRN 10/06/19 


Diphenhydramine [Benadryl*] 25 mg PO BEDTIME PRN 10/06/19 


Levothyroxine Sodium 50 mcg PO 0900 10/06/19 


Losartan/Hydrochlorothiazide [Losartan-Hctz 100-12.5 mg Tab] 1 tab PO DAILY 

10/06/19 


Simvastatin 40 mg PO DAILY 10/06/19 


Temazepam 15 mg PO BEDTIME 10/06/19 


Benzonatate [Tessalon Perle*] 100 mg PO Q6H PRN 5 Days #20 cap 04/02/21 


Fluticasone/Salmeterol [Advair 250-50 Diskus] 1 each IH BID 30 Days #1 

blst.w.dev 04/02/21 


predniSONE [Deltasone*] 10 mg PO BID 10 Days #20 tab 04/02/21 








- Past Medical/Surgical History


Diabetic: No


-: hypertension


-: hypothyroid


-: anxiety


-: Suspected COPD


-: tonsillectomy


-: appendectomy


-: ruptured spleen


-: hysterectomy


-: cyst removal from kidney


-: left hand surgery


Psychosocial/ Personal History: Patient retired, lives with family





- Family History


  ** Father


Notes: aortic aneurysm





- Social History


Smoking Status: Unknown if ever smoked


Alcohol use: Yes


CD- Drugs: Yes


Caffeine use: Yes


Place of Residence: Home





Review of Systems


is unable to be obtained





Physical Examination





- Physical Exam


General: Comatose, Obese


HEENT: Atraumatic, PERRLA, Mucous membr. moist/pink, Sclerae nonicteric


Neck: Supple, 2+ carotid pulse no bruit, No LAD, Without JVD or thyroid 

abnormality


Respiratory: Normal air movement


Cardiovascular: No edema, Regular rate/rhythm, Normal S1 S2, No gallops, No 

rubs, No murmurs


Gastrointestinal: Normal bowel sounds, No tenderness


Musculoskeletal: No tenderness


Integumentary: No rashes


Neurological: Other (intubated and sedated)


Lymphatics: No axilla or inguinal lymphadenopathy


Urinary: Amin catheter





- Studies


Laboratory Data (last 24 hrs)





10/09/21 17:06: PT 10.2, INR 0.89, APTT 33.0


10/09/21 17:06: WBC 16.20 H, Hgb 12.5, Hct 38.5, Plt Count 512 H


10/09/21 17:06: Sodium 141, Potassium 4.4, BUN 26 H, Creatinine 1.34 H, Glucose 

122 H, Total Bilirubin 0.2, AST 18, ALT 26, Alkaline Phosphatase 70








Assessment and Plan





- Problems (Diagnosis)


(1) COPD (chronic obstructive pulmonary disease)


Current Visit: Yes   Status: Chronic   


Qualifiers: 


   COPD type: unspecified COPD   Qualified Code(s): J44.9 - Chronic obstructive 

pulmonary disease, unspecified   





(2) Aspiration pneumonia


Current Visit: Yes   Status: Acute   


Qualifiers: 


   Aspiration pneumonia type: unspecified   Laterality: unspecified laterality  

 Lung location: unspecified part of lung   Qualified Code(s): J69.0 - 

Pneumonitis due to inhalation of food and vomit   





(3) Drug overdose


Current Visit: Yes   Status: Acute   


Qualifiers: 


   Encounter type: initial encounter   Injury intent: intentional self-harm   

Qualified Code(s): T50.902A - Poisoning by unspecified drugs, medicaments and 

biological substances, intentional self-harm, initial encounter   





(4) Suicide attempt


Current Visit: Yes   Status: Acute   





(5) Alcohol intoxication


Current Visit: Yes   Status: Acute   


Qualifiers: 


   Complication of substance-induced condition: with unspecified complication   

Qualified Code(s): F10.929 - Alcohol use, unspecified with intoxication, 

unspecified   





(6) Depression


Current Visit: No   Status: Chronic   


Qualifiers: 


   Depression Type: major depressive disorder   Major depression recurrence: 

unspecified whether recurrent   Active/Remission status: remission status 

unspecified   Qualified Code(s): F32.9 - Major depressive disorder, single 

episode, unspecified   





(7) Hyperlipidemia


Current Visit: No   Status: Chronic   


Qualifiers: 


   Hyperlipidemia type: unspecified   Qualified Code(s): E78.5 - Hyperlipidemia,

 unspecified   





(8) Hypertension


Onset Date: 06/28/18   Current Visit: No   Status: Chronic   


Qualifiers: 


   Hypertension type: essential hypertension   Qualified Code(s): I10 - 

Essential (primary) hypertension   





(9) Acute kidney injury


Current Visit: No   Status: Acute   





- Plan


pulm consulted, RT Consulted


continue mechanical ventilation, propofol and pressors as needed, goal is to 

wean off


continue IVF hydration and IV antibiotics


UA pending, blood cultures collected


will likely need to go to [facility] 


will reconcile and continue home medications 


DVT ppx 


Discharge Plan: Home


Plan to discharge in: 72 Hours





- Advance Directives


Does patient have a Living Will: No


Does patient have a Durable POA for Healthcare: No





- Code Status/Comfort Care


Code Status Assessed: Yes (full code )


Critical Care: Yes


Time Spent Managing Pts Care (In Minutes): 70

## 2021-10-09 NOTE — EDPHYS
Physician Documentation                                                                           

 [facility]                                                                 

Name: Elizabeth Holley                                                                                 

Age: 71 yrs                                                                                       

Sex: Female                                                                                       

: 1950                                                                                   

MRN: [id number]                                                                                   

Arrival Date: 10/09/2021                                                                          

Time: 16:59                                                                                       

Account#: [id number]                                                                            

Bed 6                                                                                             

Private MD:                                                                                       

ED Physician Alf Hargrove                                                                         

HPI:                                                                                              

10/09                                                                                             

17:06 This 71 yrs old  Female presents to ER via Un[facility] with complaints of       rn  

      Altered mental status, suicide attempt.                                                     

17:06 The patient presents with decreased responsiveness. Onset: The symptoms/episode         rn  

      began/occurred at an unknown time. Possible causes: drug use, alcohol, Sedatives.           

      Current symptoms: In the emergency department the patient's symptoms are unchanged from     

      the initial presentation. The patient has experienced a previous episode. It is unknown     

      whether or not the patient has recently seen a physician. Patient brought in by EMS,        

      police responded to a call for suicidal ideations and possible overdose. Patient had        

      told police and family that she took sleep medication and sedatives in order to kill        

      herself. EMS reports initially more alert but decreasing mental status. Given 2 mg of       

      Narcan without improvement. Patient has attempted suicide in the past with Ambien and       

      alcohol overdose. No pills found near or around patient. No unable to tell exactly what     

      she could have taken.                                                                       

                                                                                                  

Historical:                                                                                       

- Allergies:                                                                                      

17:44 hydrocodone;                                                                            sv  

- PMHx:                                                                                           

17:44 COPD; Hyperlipidemia; Hypertension; kidney Disease Stage III; Depression;               sv  

                                                                                                  

- Immunization history:: Adult Immunizations unknown.                                             

- Social history:: Smoking status: unknown.                                                       

- History obtained from: EMS.                                                                     

- Unable to obtain history due to: altered mental status, unresponsive.                           

                                                                                                  

                                                                                                  

ROS:                                                                                              

17:06 Unable to obtain ROS due to altered mental status.                                      rn  

                                                                                                  

Exam:                                                                                             

17:06 Constitutional:  Overweight female, decreased responsiveness                            rn  

17:33 ECG was reviewed by the Attending Physician.                                            rn  

                                                                                                  

Vital Signs:                                                                                      

17:17 BP 71 / 49; Pulse 94; Resp 14; Temp 96.1(TE); Pulse Ox 96% on Non-rebreather mask;      ss  

17:24 BP 89 / 62; Pulse 77;                                                                   ss  

17:30  / 92; Pulse 81; Resp 15; Pulse Ox 98% ;                                          sv  

17:47 BP 93 / 65; Pulse 62; Resp 18; Pulse Ox 100% ;                                          sv  

17:58 Weight 115 kg;                                                                          sv  

18:00  / 81; Pulse 56; Resp 16; Pulse Ox 100% on 30% FiO2 ETT vent;                     hb  

18:15  / 80; Pulse 55; Resp 17; Pulse Ox 100% on 35% FiO2 ETT vent;                     sv  

18:30  / 82; Pulse 55; Resp 16; Pulse Ox 100% on 35% FiO2 ETT vent;                     sv  

18:45  / 72; Pulse 58; Resp 16; Pulse Ox 99% on R/A;                                    hb  

                                                                                                  

[place] Coma Score:                                                                               

17:00 Eye Response: to pain(2). Verbal Response: incomprehensible(2). Motor Response:         rn  

      localizes pain(5). Total: 9.                                                                

17:38 Eye Response: none(1). Verbal Response: none(1). Motor Response: withdraws from         rn  

      pain(4). Total: 6.                                                                          

                                                                                                  

Ventilator:                                                                                       

18:00 Fi02: 35%; Rate: 16min; T.V.: 540ml; Peep: 0cm; Mode: CMV; ET tube: 7.5 fr (Oral);      sv  

                                                                                                  

Procedures:                                                                                       

17:34 Intubation: Ventilated with 100% NRB prior to procedure. O2 saturation prior to         rn  

      procedure was 90 %. Intubated orally using # 4 Gorge blade with 7.5 mm ETT. was         

      successful on first attempt. Ventilated with Ambu bag. Tube secured at right side of        

      mouth measured 23 cm at gum. Placement verified by CO2 detector with (+) color change,      

      auscultating bilateral breath sounds, O2 saturation after procedure was 96 %. Patient       

      tolerated well. Central Line: the site was prepped with Betadine, in sterile fashion, a     

      triple lumen catheter was inserted, in the right femoral vein, in 1 attempts. placement     

      was verified, by blood return, the site was dressed with Tegaderm, using sterile            

      technique, the patient tolerated the procedure, well.                                       

                                                                                                  

MDM:                                                                                              

17:00 Patient medically screened.                                                             rn  

17:31 ED course: Patient presented with GCS of 8 or 9, quickly decompensated to a GCS 6 or 7. rn  

      Decision made to emergently intubate. Patient was hypotensive and hypoxic. Intubated        

      without complication then central line placed..                                             

17:42 Test interpretation: by ED physician or midlevel provider:.                             rn  

17:42 Differential Diagnosis: electrolyte abnormality, overdose, volume depletion, SI. Data   rn  

      reviewed: vital signs, nurses notes. Test interpretation: by ED physician or midlevel       

      provider: ABG: pH 7.33, pCO2 48.7, pO2 110, HCO3 25; grossly normal ABG.                    

18:36 Counseling: I had a detailed discussion with the patient and/or guardian regarding: the rn  

      historical points, exam findings, and any diagnostic results supporting the                 

      discharge/admit diagnosis, lab results, radiology results, the need for further work-up     

      and treatment in the hospital. Response to treatment: the patient's symptoms have           

      mildly improved after treatment, and as a result, I will admit patient. Admission           

      orders: after a detailed discussion of the patient's condition and case, the admit          

      orders are written by me.                                                                   

                                                                                                  

10/09                                                                                             

17:01 Order name: Acetaminophen                                                               rn  

10/09                                                                                             

17: Order name: Basic Metabolic Panel                                                       rn  

10/09                                                                                             

17: Order name: CBC with Diff                                                               rn  

10/09                                                                                             

17: Order name: ETOH Level; Complete Time: 18:38                                            rn  

10/09                                                                                             

17:01 Order name: Hepatic Function; Complete Time: 18:38                                      rn  

10/09                                                                                             

17:01 Order name: PT-INR; Complete Time: 17:53                                                rn  

10/09                                                                                             

17: Order name: Ptt, Activated; Complete Time: 17:53                                        rn  

10/09                                                                                             

17:01 Order name: Salicylate; Complete Time: 18:20                                            rn  

10/09                                                                                             

17:01 Order name: Urine Drug Screen; Complete Time: 18:09                                     rn  

10/09                                                                                             

17:01 Order name: Acetaminophen Level; Complete Time: 18:38                                   EDMS

10/09                                                                                             

17:01 Order name: Basic Metabolic Panel; Complete Time: 18:38                                 EDMS

10/09                                                                                             

17:01 Order name: CBC with Automated Diff; Complete Time: 17:53                               EDMS

10/09                                                                                             

17:03 Order name: ABG; Complete Time: 18:38                                                   rn  

10/09                                                                                             

17:29 Order name: SARS-COV-2 RT PCR; Complete Time: 18:38                                     EDMS

10/09                                                                                             

17:41 Order name: Urine Dipstick-Ancillary; Complete Time: 17:53                              EDMS

10/10                                                                                             

05:29 Order name: CBC with Automated Diff                                                     EDMS

10/10                                                                                             

05:59 Order name: Comprehensive Metabolic Panel                                               EDMS

10/10                                                                                             

05:59 Order name: Phosphorus                                                                  EDMS

10/10                                                                                             

05:59 Order name: Lipid Profile                                                               EDMS

10/10                                                                                             

05:59 Order name: T4 Free                                                                     EDMS

10/10                                                                                             

05:59 Order name: Magnesium                                                                   EDMS

10/10                                                                                             

05:59 Order name: Thyroid Stimulating Hormone                                                 EDMS

10/10                                                                                             

17:02 Order name: Phosphorus                                                                  EDMS

10/10                                                                                             

17:02 Order name: Magnesium                                                                   EDMS

10/11                                                                                             

04:38 Order name: CBC with Automated Diff                                                     EDMS

10/11                                                                                             

04:51 Order name: Comprehensive Metabolic Panel                                               EDMS

10/12                                                                                             

05:10 Order name: CBC with Automated Diff                                                     EDMS

10/12                                                                                             

05:21 Order name: Comprehensive Metabolic Panel                                               EDMS

10/12                                                                                             

05:21 Order name: Magnesium                                                                   EDMS

10/09                                                                                             

17:01 Order name: EKG; Complete Time: 17:02                                                   rn  

10/09                                                                                             

17:01 Order name: EKG - Nurse/Tech; Complete Time: 17:46                                      rn  

10/09                                                                                             

17:01 Order name: IV Saline Lock; Complete Time: 17:47                                        rn  

10/09                                                                                             

17:01 Order name: Labs collected and sent; Complete Time: 17:46                               rn  

10/09                                                                                             

17:01 Order name: Urine Dipstick-Ancillary (obtain specimen); Complete Time: 17:47            rn  

10/09                                                                                             

17:03 Order name: O2 Per Protocol; Complete Time: 17:46                                       rn  

10/09                                                                                             

17:37 Order name: CXR XRAY; Complete Time: 18:20                                              eb  

10/09                                                                                             

17:38 Order name: Amin; Complete Time: 17:47                                                 rn  

10/09                                                                                             

17:38 Order name: NG Tube; Complete Time: 17:47                                               rn  

10/09                                                                                             

19:04 Order name: CONS Physician Consult                                                      EDMS

10/11                                                                                             

13:28 Order name: RAD                                                                         EDMS

10/12                                                                                             

09:29 Order name: Potassium                                                                   EDMS

                                                                                                  

EC:33 Rate is 98 beats/min. Rhythm is regular. QRS Axis is Normal. NH interval is normal. QT  rn  

      interval is normal. No Q waves. T waves are Normal. No ST changes noted. Clinical           

      impression: NSR w/ Non-specific ST/T Changes. Interpreted by me. Reviewed by me.            

                                                                                                  

Administered Medications:                                                                         

17:10 Drug: NS 0.9% 1000 ml Route: IV; Rate: 1000 ml; Site: right antecubital;                ss  

17:18 Drug: Etomidate 20 mg Route: IVP; Site: right antecubital;                              ss  

17:59 Follow up: Response: No adverse reaction                                                sv  

17:18 Drug: Succinylcholine 120 mg Route: IVP; Site: right antecubital;                       ss  

17:59 Follow up: Response: No adverse reaction                                                sv  

17:30 Drug: fentaNYL (PF) 100 mcg Route: IVP; Site: right femoral;                            sv  

17:59 Follow up: Response: No adverse reaction                                                sv  

17:52 Drug: Versed (midazolam) 2 mg Route: IVP; Site: right femoral;                          sv  

17:58 Drug: Propofol 5 mcg/kg/min Route: IV; Rate: calculated rate; Site: right femoral;      sv  

20:03 Follow up: Response: No adverse reaction; IV Status: Infusion continued upon admission  jb4 

17:58 Drug: Levophed (norepinephrine) (4 mg/250 mL D5W 4 mcg/min {Note: started at 5mcg/ml.}  sv  

      Route: IV; Rate: calculated rate; Site: right femoral;                                      

20:03 Follow up: Response: No adverse reaction; IV Status: Infusion continued upon admission  jb4 

18:45 Drug: Zosyn (piperacillin-tazobactam) 3.375 grams Route: IVPB; Infused Over: 60 mins;   sv  

      Site: right femoral;                                                                        

19:45 Follow up: Response: No adverse reaction; IV Status: Completed infusion; IV Intake:     jb4 

      100ml                                                                                       

                                                                                                  

                                                                                                  

Disposition:                                                                                      

18:36 Critical Care:.                                                                         rn  

                                                                                                  

Disposition Summary:                                                                              

10/09/21 18:37                                                                                    

Hospitalization Ordered                                                                           

      Hospitalization Status: Inpatient Admission                                             rn  

      Provider: Critsopher Espinosa                                                                rn  

      Condition: Fair                                                                         rn  

      Problem: new                                                                            rn  

      Symptoms: have improved                                                                 rn  

      Bed/Room Type: Standard                                                                 rn  

      Location: [facility] ER HOLD(10/09/21 19:21)                                                    

      Room Assignment: ERHOLD-(10/09/21 19:21)                                                mw  

      Diagnosis                                                                                   

        - Suicide attempt                                                                     rn  

        - Altered mental status, unspecified                                                  rn  

        - Adverse effect of other antiepileptic and sedative-hypnotic drugs, initial encounterrn  

        - Pneumonia, unspecified organism                                                     rn  

      Forms:                                                                                      

        - Medication Reconciliation Form                                                      rn  

        - SBAR form                                                                           rn  

Critical care time excluding procedures:                                                          

18:36 Critical care time: Bedside Care: 35 minutes. Total time: 35 minutes                    rn  

                                                                                                  

Addendum:                                                                                         

10/15/2021                                                                                        

     05:07 Addendum: Physical exam: Gen: Pt sedated, only responds to painful stimuli. Neuro:      r
n

           Sedated, moves all 4 extremities to painful stimuli, no spont speech. Cardiac:         

           bradycardic, regular, no pulse deficits. Resp: clear bilateral breath sounds. Abd: no  

           abd distension or tenderness. EXT: no cyanosis, FROM. Skin: no cyanosis, no signs of   

           cellulitis. ENT: no oral laceration or swelling. EYES: no nystagmus. .                 

                                                                                                  

Signatures:                                                                                       

Dispatcher [employer]                           EDMS                                                 

Yaa Gerardo RN                    Simona Mckeon RN                        Alf Shepherd MD MD rn Smirch, Shelby, RN RN                                                      

Yoav Garcia RN   jb4                                                  

                                                                                                  

Corrections: (The following items were deleted from the chart)                                    

10/09                                                                                             

17:07 17:06 Patient brought in by EMS, police responded to a call for suicidal ideations and  rn  

      possible overdose. Patient had told police and family that she took sleep medication        

      and sedatives in order to kill herself. EMS reports initially more alert but decreasing     

      mental status. Given 2 mg of Narcan without improvement. Patient has attempted suicide      

      in the past with Ambien and alcohol overdose.. rn                                           

17:29 17:21 CORONAVIRUS+MR.LAB.BRZ ordered. [facility]                                              EDMS

19:21 18:37 Intensive Care Unit rn                                                            spike  

19:21 18:37 dayana lala  

                                                                                                  

**************************************************************************************************

## 2021-10-09 NOTE — RAD REPORT
EXAM DESCRIPTION:  RAD - Chest Single View - 10/9/2021 6:10 pm

 

CLINICAL HISTORY:  post intubation

Chest pain.

 

COMPARISON:  Chest Pa And Lat (2 Views) dated 4/1/2021; Chest Single View dated 10/6/2019; Chest Pa A
nd Lat (2 Views) dated 6/11/2019; Chest Single View dated 6/27/2018

 

FINDINGS:  Portable technique limits examination quality.

 

Tip of the endotracheal tube is at the level of the superior margin of the aortic arch, considered pr
operly placed. Enteric tube descends in the stomach. Mild bilateral interstitial opacities, greatest 
on the left in the left lung base likely indicates infection/pneumonia.The heart is upper limit herminia
l size.

## 2021-10-10 LAB
ALBUMIN SERPL BCP-MCNC: 2.9 G/DL (ref 3.4–5)
ALP SERPL-CCNC: 60 U/L (ref 45–117)
ALT SERPL W P-5'-P-CCNC: 33 U/L (ref 12–78)
AST SERPL W P-5'-P-CCNC: 23 U/L (ref 15–37)
BUN BLD-MCNC: 19 MG/DL (ref 7–18)
GLUCOSE SERPLBLD-MCNC: 98 MG/DL (ref 74–106)
HCT VFR BLD CALC: 35.2 % (ref 36–45)
HDLC SERPL-MCNC: 73 MG/DL (ref 40–60)
LDLC SERPL CALC-MCNC: 19 MG/DL (ref ?–130)
LYMPHOCYTES # SPEC AUTO: 4.4 K/UL (ref 0.7–4.9)
MAGNESIUM SERPL-MCNC: 1.9 MG/DL (ref 1.8–2.4)
MAGNESIUM SERPL-MCNC: 2 MG/DL (ref 1.8–2.4)
PMV BLD: 6.7 FL (ref 7.6–11.3)
POTASSIUM SERPL-SCNC: 3.1 MMOL/L (ref 3.5–5.1)
RBC # BLD: 3.86 M/UL (ref 3.86–4.86)
TSH SERPL DL<=0.05 MIU/L-ACNC: 1.13 UIU/ML (ref 0.36–3.74)

## 2021-10-10 RX ADMIN — FENTANYL CITRATE PRN MCG: 50 INJECTION, SOLUTION INTRAMUSCULAR; INTRAVENOUS at 11:15

## 2021-10-10 RX ADMIN — POTASSIUM CHLORIDE SCH MLS: 200 INJECTION, SOLUTION INTRAVENOUS at 11:00

## 2021-10-10 RX ADMIN — HEPARIN SODIUM SCH UNIT: 5000 INJECTION, SOLUTION INTRAVENOUS; SUBCUTANEOUS at 17:00

## 2021-10-10 RX ADMIN — Medication SCH ML: at 09:00

## 2021-10-10 RX ADMIN — HEPARIN SODIUM SCH UNIT: 5000 INJECTION, SOLUTION INTRAVENOUS; SUBCUTANEOUS at 01:00

## 2021-10-10 RX ADMIN — FAMOTIDINE SCH MG: 10 INJECTION, SOLUTION INTRAVENOUS at 09:00

## 2021-10-10 RX ADMIN — HALOPERIDOL LACTATE PRN MG: 5 INJECTION, SOLUTION INTRAMUSCULAR at 22:47

## 2021-10-10 RX ADMIN — FENTANYL CITRATE PRN MCG: 50 INJECTION, SOLUTION INTRAMUSCULAR; INTRAVENOUS at 06:28

## 2021-10-10 RX ADMIN — SODIUM CHLORIDE SCH MLS: 9 INJECTION, SOLUTION INTRAVENOUS at 09:00

## 2021-10-10 RX ADMIN — ACETAMINOPHEN PRN MG: 500 TABLET, FILM COATED ORAL at 20:43

## 2021-10-10 RX ADMIN — Medication SCH ML: at 19:52

## 2021-10-10 RX ADMIN — FENTANYL CITRATE PRN MCG: 50 INJECTION, SOLUTION INTRAMUSCULAR; INTRAVENOUS at 17:13

## 2021-10-10 RX ADMIN — POTASSIUM CHLORIDE SCH MLS: 200 INJECTION, SOLUTION INTRAVENOUS at 09:00

## 2021-10-10 RX ADMIN — SODIUM CHLORIDE SCH MLS: 9 INJECTION, SOLUTION INTRAVENOUS at 02:00

## 2021-10-10 RX ADMIN — SODIUM CHLORIDE SCH MLS: 9 INJECTION, SOLUTION INTRAVENOUS at 17:00

## 2021-10-10 RX ADMIN — HEPARIN SODIUM SCH UNIT: 5000 INJECTION, SOLUTION INTRAVENOUS; SUBCUTANEOUS at 09:00

## 2021-10-10 RX ADMIN — DEXTROSE AND SODIUM CHLORIDE SCH MLS: 5; .45 INJECTION, SOLUTION INTRAVENOUS at 16:00

## 2021-10-10 RX ADMIN — FAMOTIDINE SCH MG: 10 INJECTION, SOLUTION INTRAVENOUS at 19:52

## 2021-10-10 RX ADMIN — ACETAMINOPHEN PRN MG: 500 TABLET, FILM COATED ORAL at 16:30

## 2021-10-10 RX ADMIN — DEXTROSE AND SODIUM CHLORIDE SCH MLS: 5; .45 INJECTION, SOLUTION INTRAVENOUS at 06:00

## 2021-10-10 NOTE — P.PN
Subjective


Date of Service: 10/10/21


Chief Complaint: suicide attempt


Patient intubated, sedated and on mechanical ventilation.


She has easy arousable.








Physical Examination





- Vital Signs


Temperature: 97.4 F


Blood Pressure: 114/63


Pulse: 102


Respirations: 20


Pulse Ox (%): 98





- Physical Exam


General: Other (Sedated)


HEENT: Other (ET tube)


Neck: JVD not distended


Respiratory: Clear to auscultation bilaterally, Normal air movement


Cardiovascular: No edema, Normal S1 S2, Other (Tachycardia)


Gastrointestinal: Normal bowel sounds, Soft and benign, Non-distended, No 

tenderness


Musculoskeletal: No swelling


Integumentary: No rashes


Neurological: Normal strength at 5/5 x4 extr





- Studies


Laboratory Data (last 24 hrs)





10/09/21 17:06: PT 10.2, INR 0.89, APTT 33.0


10/09/21 17:06: WBC 16.20 H, Hgb 12.5, Hct 38.5, Plt Count 512 H


10/09/21 17:06: Sodium 141, Potassium 4.4, BUN 26 H, Creatinine 1.34 H, Glucose 

122 H, Total Bilirubin 0.2, AST 18, ALT 26, Alkaline Phosphatase 70








Assessment And Plan





- Current Problems (Diagnosis)


(1) Alcohol intoxication


Current Visit: Yes   Status: Acute   


Qualifiers: 


   Complication of substance-induced condition: with unspecified complication   

Qualified Code(s): F10.929 - Alcohol use, unspecified with intoxication, 

unspecified   





(2) Suicide attempt


Current Visit: Yes   Status: Acute   





(3) COPD (chronic obstructive pulmonary disease)


Current Visit: Yes   Status: Chronic   


Qualifiers: 


   COPD type: unspecified COPD   Qualified Code(s): J44.9 - Chronic obstructive 

pulmonary disease, unspecified   





(4) Acute metabolic encephalopathy


Current Visit: Yes   Status: Acute   





(5) Aspiration pneumonia


Current Visit: Yes   Status: Acute   


Qualifiers: 


   Aspiration pneumonia type: unspecified   Laterality: unspecified laterality  

Lung location: unspecified part of lung   Qualified Code(s): J69.0 - Pneumonitis

due to inhalation of food and vomit   





(6) Hypovolemic shock


Current Visit: Yes   Status: Acute   





- Plan


Patient extubated.  Hypovolemic shock resolved.  Patient is currently 

normotensive.


Oxygen therapy as needed.


Monitor for alcohol withdrawal symptoms.


Ativan IV p.r.n.


Diet as tolerated.


Continue IV antibiotics for possible aspiration pneumonia.


Psychiatric consult.


Replete electrolytes-potassium replacement.


Monitor CBC and blood chemistry.

## 2021-10-11 LAB
ALBUMIN SERPL BCP-MCNC: 2.7 G/DL (ref 3.4–5)
ALP SERPL-CCNC: 62 U/L (ref 45–117)
ALT SERPL W P-5'-P-CCNC: 27 U/L (ref 12–78)
AST SERPL W P-5'-P-CCNC: 17 U/L (ref 15–37)
BUN BLD-MCNC: 13 MG/DL (ref 7–18)
GLUCOSE SERPLBLD-MCNC: 112 MG/DL (ref 74–106)
HCT VFR BLD CALC: 34.2 % (ref 36–45)
LYMPHOCYTES # SPEC AUTO: 3.6 K/UL (ref 0.7–4.9)
PMV BLD: 6.7 FL (ref 7.6–11.3)
POTASSIUM SERPL-SCNC: 4.1 MMOL/L (ref 3.5–5.1)
RBC # BLD: 3.73 M/UL (ref 3.86–4.86)

## 2021-10-11 RX ADMIN — ACETAMINOPHEN PRN MG: 500 TABLET, FILM COATED ORAL at 03:15

## 2021-10-11 RX ADMIN — Medication SCH ML: at 09:00

## 2021-10-11 RX ADMIN — ACETAMINOPHEN PRN MG: 500 TABLET, FILM COATED ORAL at 08:22

## 2021-10-11 RX ADMIN — DEXTROSE AND SODIUM CHLORIDE SCH MLS: 5; .45 INJECTION, SOLUTION INTRAVENOUS at 20:27

## 2021-10-11 RX ADMIN — SODIUM CHLORIDE SCH MLS: 9 INJECTION, SOLUTION INTRAVENOUS at 00:21

## 2021-10-11 RX ADMIN — DEXTROSE AND SODIUM CHLORIDE SCH MLS: 5; .45 INJECTION, SOLUTION INTRAVENOUS at 00:21

## 2021-10-11 RX ADMIN — HEPARIN SODIUM SCH UNIT: 5000 INJECTION, SOLUTION INTRAVENOUS; SUBCUTANEOUS at 00:20

## 2021-10-11 RX ADMIN — Medication SCH: at 20:26

## 2021-10-11 RX ADMIN — Medication SCH ML: at 20:26

## 2021-10-11 RX ADMIN — HEPARIN SODIUM SCH UNIT: 5000 INJECTION, SOLUTION INTRAVENOUS; SUBCUTANEOUS at 09:00

## 2021-10-11 RX ADMIN — DEXTROSE AND SODIUM CHLORIDE SCH MLS: 5; .45 INJECTION, SOLUTION INTRAVENOUS at 12:00

## 2021-10-11 RX ADMIN — HEPARIN SODIUM SCH UNIT: 5000 INJECTION, SOLUTION INTRAVENOUS; SUBCUTANEOUS at 17:00

## 2021-10-11 RX ADMIN — FAMOTIDINE SCH MG: 10 INJECTION, SOLUTION INTRAVENOUS at 09:00

## 2021-10-11 RX ADMIN — SODIUM CHLORIDE SCH MLS: 9 INJECTION, SOLUTION INTRAVENOUS at 09:00

## 2021-10-11 RX ADMIN — HALOPERIDOL LACTATE PRN MG: 5 INJECTION, SOLUTION INTRAMUSCULAR at 21:16

## 2021-10-11 NOTE — P.CNS
Date of Consult: 10/10/21


Reason for Consult: Respiratory failure


Chief Complaint: suicide attempt


History of Present Illness: 


Patient is 71 years of age multiple medical problems with a history of attempted

suicide before was found unresponsive patient was intubated the time of my miguelito

luation was doing well on the ventilator for follow-up was just stopped 

otherwise stable she was also hypotensive hypoxic significantly elevated alcohol

level





Allergies





hydrocodone [Hydrocodone] Adverse Reaction (Intermediate, Verified 02/22/12 

06:25)


   Nausea/Vomiting





Home Medications: 








Albuterol Sulfate [Proair Hfa] 2 puff IN Q4H PRN 10/06/19 


Diphenhydramine [Benadryl*] 25 mg PO BEDTIME PRN 10/06/19 


Levothyroxine Sodium 50 mcg PO 0900 10/06/19 


Losartan/Hydrochlorothiazide [Losartan-Hctz 100-12.5 mg Tab] 1 tab PO DAILY 

10/06/19 


Simvastatin 40 mg PO DAILY 10/06/19 


Temazepam 15 mg PO BEDTIME 10/06/19 


Benzonatate [Tessalon Perle*] 100 mg PO Q6H PRN 5 Days #20 cap 04/02/21 


Fluticasone/Salmeterol [Advair 250-50 Diskus] 1 each IH BID 30 Days #1 

blst.w.dev 04/02/21 


predniSONE [Deltasone*] 10 mg PO BID 10 Days #20 tab 04/02/21 








- Past Medical/Surgical History


Diabetic: No


-: hypertension


-: hypothyroid


-: anxiety


-: Suspected COPD


-: tonsillectomy


-: appendectomy


-: ruptured spleen


-: hysterectomy


-: cyst removal from kidney


-: left hand surgery


Psychosocial/ Personal History: Patient retired, lives with family





- Family History


  ** Father


Notes: aortic aneurysm





- Social History


Smoking Status: Unknown if ever smoked


Alcohol use: Yes


CD- Drugs: Yes


Caffeine use: Yes


Place of Residence: Home





Review of Systems


is unable to be obtained





Physical Examination














Temp Pulse Resp BP Pulse Ox


 


 97.5 F   85   20   132/75   98 


 


 10/11/21 12:00  10/11/21 12:00  10/11/21 12:00  10/11/21 12:00  10/11/21 12:00








General: Unresponsive


Respiratory: Clear to auscultation bilaterally


Cardiovascular: No edema, Normal S1 S2





- Problems


(1) Respiratory failure


Current Visit: Yes   Status: Acute   


Plan: 


Patient is 71 years of age was found unresponsive hypoxic intubated possible 

drug overdose she has had suicidal attempts before plan to wean her off and 

extubate from the ventilator chemistries reviewed chest x-ray is clear vital 

signs stable is cussed with respiratory when she is more awake plan to extubate


Qualifiers: 


   Chronicity: acute

## 2021-10-11 NOTE — EKG
Test Date:    2021-10-09               Test Time:    17:03:46

Technician:   SBS                                    

                                                     

MEASUREMENT RESULTS:                                       

Intervals:                                           

Rate:         98                                     

MD:           176                                    

QRSD:         102                                    

QT:           380                                    

QTc:          485                                    

Axis:                                                

P:            57                                     

MD:           176                                    

QRS:          -5                                     

T:            54                                     

                                                     

INTERPRETIVE STATEMENTS:                                       

                                                     

Normal sinus rhythm

Inferior infarct, age undetermined

Abnormal ECG

Compared to ECG 04/01/2021 16:33:22

Incomplete right bundle-branch block no longer present

Myocardial infarct finding still present



Electronically Signed On 10-11-21 07:04:56 CDT by Jorge Reveles

## 2021-10-11 NOTE — RAD REPORT
EXAM DESCRIPTION:  [place]up[place] Single View10/11/2021 1:03 pm

 

CLINICAL HISTORY:  Respiratory failure

 

COMPARISON:  October 9, 2021

 

FINDINGS:  Endotracheal and nasogastric tubes been removed.

 

 The lungs appear clear of acute infiltrate. The heart is normal size. Calcified lung granulomas are 
noted.

 

IMPRESSION:   No acute abnormalities displayed

## 2021-10-11 NOTE — P.PN
Subjective


Date of Service: 10/11/21


Chief Complaint: suicide attempt


Patient successfully extubated yesterday.


She is currently awake and alert.


Tolerating food.


She is maintained on oxygen by nasal cannula.








Physical Examination





- Vital Signs


Temperature: 98.7 F


Blood Pressure: 139/92


Pulse: 85


Respirations: 20


Pulse Ox (%): 98





- Physical Exam


General: Alert, In no apparent distress, Oriented x3


HEENT: Atraumatic, Mucous membr. moist/pink


Neck: JVD not distended


Respiratory: Clear to auscultation bilaterally, Normal air movement


Cardiovascular: No edema, Regular rate/rhythm, Normal S1 S2


Gastrointestinal: Normal bowel sounds, Soft and benign, Non-distended, No 

tenderness


Musculoskeletal: No swelling


Integumentary: No rashes


Neurological: Normal speech, Normal strength at 5/5 x4 extr





Assessment And Plan





- Current Problems (Diagnosis)


(1) Alcohol intoxication


Current Visit: Yes   Status: Acute   


Qualifiers: 


   Complication of substance-induced condition: with unspecified complication   

Qualified Code(s): F10.929 - Alcohol use, unspecified with intoxication, 

unspecified   





(2) Suicide attempt


Current Visit: Yes   Status: Acute   





(3) COPD (chronic obstructive pulmonary disease)


Current Visit: Yes   Status: Chronic   


Qualifiers: 


   COPD type: unspecified COPD   Qualified Code(s): J44.9 - Chronic obstructive 

pulmonary disease, unspecified   





(4) Acute metabolic encephalopathy


Current Visit: Yes   Status: Acute   





(5) Aspiration pneumonia


Current Visit: Yes   Status: Acute   


Qualifiers: 


   Aspiration pneumonia type: unspecified   Laterality: unspecified laterality  

Lung location: unspecified part of lung   Qualified Code(s): J69.0 - Pneumonitis

due to inhalation of food and vomit   





(6) Hypovolemic shock


Current Visit: Yes   Status: Acute   





- Plan


Patient extubated.  Hypovolemic shock resolved.  Patient is currently 

normotensive.


Currently on oxygen by nasal canula.


Pulmonary input appreciated.


Had a discussion with the patient.


She denied any suicidal attempt.


She stated she took sleeping pills and drank a glass of vodka.  She states she 

drinks a glass of vodka every night.


Her alcohol level was 317 on presentation and does not correspond with the 

amount of alcohol she stated she took.


She mentioned she she was upset with her daughter and told her she will end her 

life that given before she took the sleeping pill and drank alcohol.  She stated

her daughter might have  her actions as suicide.  She reports history

of depression from year old adulthood and has been taking fluoxetine for a long 

time.  She has not seen a psychiatrist or the counselor for several years.


Monitor for alcohol withdrawal symptoms.


Ativan IV p.r.n.


Diet as tolerated.


Continue IV antibiotics for possible aspiration pneumonia.


Psychiatry consulted.  Case discussed with Dr. Jacobs will plans to evaluate 

patient tomorrow morning.


Replete electrolytes as needed.


Monitor CBC and blood chemistry.

## 2021-10-12 VITALS — TEMPERATURE: 98.7 F

## 2021-10-12 VITALS — OXYGEN SATURATION: 99 %

## 2021-10-12 VITALS — DIASTOLIC BLOOD PRESSURE: 69 MMHG | SYSTOLIC BLOOD PRESSURE: 131 MMHG

## 2021-10-12 LAB
ALBUMIN SERPL BCP-MCNC: 3.1 G/DL (ref 3.4–5)
ALP SERPL-CCNC: 66 U/L (ref 45–117)
ALT SERPL W P-5'-P-CCNC: 32 U/L (ref 12–78)
AST SERPL W P-5'-P-CCNC: 19 U/L (ref 15–37)
BUN BLD-MCNC: 14 MG/DL (ref 7–18)
GLUCOSE SERPLBLD-MCNC: 109 MG/DL (ref 74–106)
HCT VFR BLD CALC: 35.8 % (ref 36–45)
LYMPHOCYTES # SPEC AUTO: 4.3 K/UL (ref 0.7–4.9)
MAGNESIUM SERPL-MCNC: 2.2 MG/DL (ref 1.8–2.4)
PMV BLD: 7 FL (ref 7.6–11.3)
POTASSIUM SERPL-SCNC: 3.7 MMOL/L (ref 3.5–5.1)
RBC # BLD: 3.91 M/UL (ref 3.86–4.86)

## 2021-10-12 RX ADMIN — Medication SCH: at 09:00

## 2021-10-12 RX ADMIN — DEXTROSE AND SODIUM CHLORIDE SCH MLS: 5; .45 INJECTION, SOLUTION INTRAVENOUS at 08:00

## 2021-10-12 RX ADMIN — HEPARIN SODIUM SCH UNIT: 5000 INJECTION, SOLUTION INTRAVENOUS; SUBCUTANEOUS at 09:00

## 2021-10-12 RX ADMIN — Medication SCH ML: at 09:00

## 2021-10-12 RX ADMIN — HEPARIN SODIUM SCH UNIT: 5000 INJECTION, SOLUTION INTRAVENOUS; SUBCUTANEOUS at 00:28

## 2021-10-12 NOTE — P.PN
Date of Service: 10/12/21





Subjective:


Patient feeling better, denies suicidal thoughts.  States this really only 

occurs when she is drinking alcohol.  She states that she is remorseful, no 

plans to hurt herself currently.


Reports ongoing shortness of breath, still on 2 L nasal cannula, mild cough.  

She does report recently being treated with steroids, outpatient basis from 

pulmonology


Denies nausea/vomiting, no abdominal/chest pain, no diarrhea





ROS: 


10 point review of systems otherwise negative





Physical Exam


General: Alert, In no apparent distress, Oriented x3


HEENT: Atraumatic, Mucous membr. moist/pink


Respiratory: Slightly diminished at bases bilaterally mild expiratory wheeze, 

otherwise clear to auscultation, nonlabored on 2 L nasal cannula


Cardiovascular: No edema, Regular rate/rhythm, Normal S1 S2


Gastrointestinal: soft, Non-distended, No tenderness


Musculoskeletal: No swelling


Neurological: Normal speech, Normal strength at 5/5 x4 extr





Assessment And Plan


Possible suicide attempt, no longer with suicidal ideation


Alcohol intoxication, history of chronic alcohol use


Acute on chronic COPD exacerbation with hypoxemia


Acute metabolic encephalopathy secondary to medication


Pneumonitis versus possible aspiration pneumonia


Hypovolemic shock, resolved





Patient extubated a few days go.  Hypovolemic shock resolved.  Patient is 

currently normotensive / hypertensive


on 2 L NC, SpO2 down to mid-80s on RA within seconds of removing NC this morning


Pulmonary input appreciated. possible aspiration pneumonia vs pneumonitis


Currently denies any suicide ideation, does not have any current plans to harm 

self.


States this all seems to be related when she drinks alcohol, states she has a 

plan to cut back/quit soon.


Psych to see patient today


monitor cbc / electrolytes


can downgrade patient to telemetry - no longer suicidal


Replete electrolytes as needed.


Monitor CBC and blood chemistry.


wean O2


PT consulted





Dispo: anticipate dc home in 24- 48hrs

## 2021-10-12 NOTE — P.DS
Admission Date: 10/09/21


Discharge Date: 10/12/21


Disposition: ROUTINE DISCHARGE


Discharge Condition: GOOD


Reason for Admission: suicide attempt


Consultations: 


Pulmonology - Dr. Callejas


Psychiatry - Dr. Jacobs





Procedures: 


CXR (10/9):


Tip of the endotracheal tube is at the level of the superior margin of the 

aortic arch, considered properly placed. Enteric tube descends in the stomach. 

Mild bilateral interstitial opacities, greatest on the left in the left lung 

base likely indicates infection/pneumonia.The heart is upper limit normal size.





CXR (10/11):


The lungs appear clear of acute infiltrate. The heart is normal size. Calcified 

lung granulomas are noted.


IMPRESSION:   No acute abnormalities displayed





Problem list


Possible suicide attempt, no longer with suicidal ideation


Alcohol intoxication, history of chronic alcohol use


Acute on chronic COPD exacerbation with hypoxemia


Acute metabolic encephalopathy secondary to medication overdose / alcohol 

intoxication


Hypovolemic shock, resolved secondary to overmedication / intoxication





Brief History of Present Illness: 


70 yo F with anxiety and depression, hypertension, hypothyroidism and copd who 

was brought in by EMS for suicide attempt and overdose. Patient told police that

she took sleeping medications and sedatives in order to kill herself. EMS 

reports she was initially more alert but had a progressive decrease in mental 

status. She was given 2mg of Narcan by EMS without improvement. Patient has 

attempted suicide in the past with ambien and alcohol. EMS did not find pills 

near or around the patient upon arrival so unknown exactly what she took. 

Patient presented with a GCS of 8 or 9 which decreased to 6 or 7 so ER physician

intubated the patient and placed a central line. She was also hypotensive and 

hypoxic and was initially started on levophed and propofol. WBC 16.2. BUN 26, Cr

1.34, GFR 39. Alcohol 317.      





Hospital Course: 


Patient was intubated on admission. Pulmonology was consulted.  Patient improved

rather quickly this medications and medical worked out of her system.  She was 

subsequently extubated and continued to do well.  There was no evidence of acute

infectious process or any other pathologic process going on besides her 

overmedication and alcohol intoxication.  After extubation, she did report 

having ongoing COPD exacerbation, and she was treated with nebulizers and 

prednisone.


The patient's daughter felt this was repeated suicide attempt.  Psychiatry was 

consulted and evaluated the patient on 10/12.  Was felt that the patient was no 

longer suicidal, expressed remorse and no further intent to harm herself.  

Recommended increasing Prozac to 60 mg daily.


Patient was weaned off oxygen, she was ambulating with some mild dyspnea.  She 

was deemed stable to discharge home with her daughter.


She is to continue with a new prescription of prednisone 10 mg twice daily x10 

days per pulmonology.


Otherwise she is to continue her other home medications.





Follow-up with pulmonology in 1 week.


Follow-up with PCP in 3 to 5 days.


Follow-up with psychiatry within 3 days





Vital Signs/Physical Exam: 





Physical Exam


General: Alert, In no apparent distress, Oriented x3


HEENT: Atraumatic, Mucous membr. moist/pink


Respiratory: Slightly diminished at bases bilaterally with mild expiratory 

wheeze, nonlabored on room air


Cardiovascular: No edema, Regular rate/rhythm, Normal S1 S2


Gastrointestinal: soft, Non-distended, No tenderness


Musculoskeletal: No swelling


Neurological: Normal speech, Normal strength at 5/5 x4 extr








Temp Pulse Resp BP Pulse Ox


 


 98.7 F   101 H  20   131/75   98 


 


 10/12/21 07:00  10/12/21 15:00  10/12/21 15:00  10/12/21 15:00  10/12/21 15:00








Laboratory Data at Discharge: 














WBC  14.40 K/uL (4.3-10.9)  H  10/12/21  04:54    


 


Hgb  11.7 g/dL (12.0-15.0)  L  10/12/21  04:54    


 


Hct  35.8 % (36.0-45.0)  L  10/12/21  04:54    


 


Plt Count  430 K/uL (152-406)  H  10/12/21  04:54    


 


PT  10.2 SECONDS (9.5-12.5)   10/09/21  17:06    


 


INR  0.89   10/09/21  17:06    


 


APTT  33.0 SECONDS (24.3-36.9)   10/09/21  17:06    


 


Sodium  141 mmol/L (136-145)   10/12/21  04:54    


 


Potassium  Cancelled   10/12/21  10:00    


 


BUN  14 mg/dL (7-18)   10/12/21  04:54    


 


Creatinine  1.03 mg/dL (0.55-1.3)   10/12/21  04:54    


 


Glucose  109 mg/dL ()  H  10/12/21  04:54    


 


Phosphorus  2.7 mg/dL (2.5-4.9)   10/10/21  16:25    


 


Magnesium  2.2 mg/dL (1.8-2.4)   10/12/21  04:54    


 


Total Bilirubin  0.4 mg/dL (0.2-1.0)   10/12/21  04:54    


 


AST  19 U/L (15-37)   10/12/21  04:54    


 


ALT  32 U/L (12-78)   10/12/21  04:54    


 


Alkaline Phosphatase  66 U/L ()   10/12/21  04:54    


 


Triglycerides  295 mg/dL (<150)  H  10/10/21  05:15    


 


Cholesterol  151 mg/dL (<200)   10/10/21  05:15    


 


HDL Cholesterol  73 mg/dL (40-60)  H  10/10/21  05:15    


 


Cholesterol/HDL Ratio  2.07   10/10/21  05:15    








Home Medications: 








Albuterol Sulfate [Proair Hfa] 2 puff IN Q4H PRN 10/06/19 


Diphenhydramine [Benadryl*] 25 mg PO BEDTIME PRN 10/06/19 


Levothyroxine Sodium 50 mcg PO 0900 10/06/19 


Losartan/Hydrochlorothiazide [Losartan-Hctz 100-12.5 mg Tab] 1 tab PO DAILY 

10/06/19 


Simvastatin 40 mg PO DAILY 10/06/19 


Temazepam 15 mg PO BEDTIME 10/06/19 


Benzonatate [Tessalon Perle*] 100 mg PO Q6H PRN 5 Days #20 cap 04/02/21 


Fluticasone/Salmeterol [Advair 250-50 Diskus] 1 each IH BID 30 Days #1 

blst.w.dev 04/02/21 


Fluoxetine HCl [Prozac] 60 mg PO DAILY 30 Days #90 capsule 10/12/21 


predniSONE [Deltasone*] 10 mg PO BID 10 Days #20 tab 10/12/21 





New Medications: 


predniSONE [Deltasone*] 10 mg PO BID 10 Days #20 tab


Fluoxetine HCl [Prozac] 60 mg PO DAILY 30 Days #90 capsule


Physician Discharge Instructions: 


You were found to be minimally responsive due to medication and alcohol intake. 

You required intubation (mechanical ventilation). You improved with IV fluids 

and with time to allow the medications/alcohol to wear off. You did not have 

evidence of infection. You did briefly require some oxygen and were treated for 

acute COPD exacerbation. Dr. Callejas was consulted and agreed you were stable 

for discharge home. Dr. Jacobs was consulted (Psych) and recommended 

discharge with higher dose prescription for your Prozac.


Follow up with Dr. Jacobs within 3 days, as discussed.


Follow up with Dr. Callejas in 1 week.





Diet: Regular


Activity: Ad [place]


Followup: 


NONE,NONE [Primary Care Provider] - 


Time spent managing pt's care (in minutes): 45

## 2021-10-14 NOTE — CON
__________ at [facility].



Reason For Consult:  To evaluate patient for depression and suicidal ideations.



History Of Present Illness:  Ms. Kishan Fierro is a 71-year-old  female, , with a psych
iatric history significant for major depressive disorder, recurrent anxiety disorder, unspecified.  T
he patient was brought into the emergency room the night of October 12, 2021, on account of being fou
nd with an apparent overdose of medication __________ in attempt to kill herself.  On interview today
, the patient was remorseful of her action, states she is no longer suicidal and endorsed she is stil
l depressed.  She states that what she did was "stupid" __________.  The patient admits a history of 
recurrent depression, also admits to previous hospitalization in the past 5 years.  She states that s
he began taking Prozac all her life, ibuprofen, and states the current depressive episode was trigger
ed by every day life stressors, [place] situation [place] the country, __________ home.  They both live to
gether in the house, and they are both together.  The patient states she got really hurt and got more
 depressed.  She said she acted impulsively by drinking.  She said she knows she has to give up her d
rinking habit __________.  She also has history of insomnia.  The patient admits to anhedonia, lack o
f energy or motivation.  She states in the past her depression was better controlled with Prozac at 4
0 mg, but seems to have lost its effect.  She denies history of psychosis, denies history of bipolar 
depression, denies history of OCD, no history of PTSD though she __________ trauma.  She is on [employer]
[facility] and also a therapist.  She states she will follow up with __________ she does have other options w
[facility] include given the environment __________.  I also spoke with the patient's daughter who ________
__ discharged home as she will be able to monitor her and make sure she follows with the psychiatric 
consult on discharge.



Objective:  Vital Signs:  Blood pressure is 121/75, her heart rate is 93 beats per minute, respirator
y rate is 18 beats per minute.  The patient is saturating 98% on oxygen via nasal cannula.



Mental Status Examination:  The patient is an obese  female __________ not in any acute dist
ress, on oxygen via nasal cannula.  She is cooperative with interview.  Speech is spontaneous, normal
 in rate, rhythm, and volume.  No preservation noted.  __________.  Mood, she described a dysphoric a
ffect.  Mood congruent.  Thought process, mostly linear; at times, circumstantial.  Thought content, 
no delusional thinking.  Currently, denies suicidal ideation.  Remorseful __________.  No visual or a
uditory hallucination.  The patient has __________ .  Insight, judgement, impulse control is fair.  F
und of knowledge is fair.  Language skill is fair.



Impression:  A 71-year-old  female with history of chronic depression.  __________ suicidal 
attempt by overdose on prescription medication __________, no longer having suicidal ideation but sti
ll felling depressed __________ would like to give up drinking.  Prognosis is fair.



Diagnoses:  

1.Major depressive disorder, recurrent, severe, has psychotic features.

2.Anxiety disorder, unspecified. 

3.Overuse of __________.



Plan:  

1.Recommend again discharge home with her daughter __________.

2.Follow up with psychiatric __________ psychiatry outpatient clinic.

3.We are recommending patient Prozac 60 mg for depression secondary to concerns for __________.





KO/MODL

DD:  10/13/2021 05:32:40Voice ID:  [id number]

DT:  10/13/2021 10:45:28Report ID:  [id number]